# Patient Record
Sex: MALE | Race: WHITE | NOT HISPANIC OR LATINO | Employment: FULL TIME | URBAN - METROPOLITAN AREA
[De-identification: names, ages, dates, MRNs, and addresses within clinical notes are randomized per-mention and may not be internally consistent; named-entity substitution may affect disease eponyms.]

---

## 2018-01-30 ENCOUNTER — OFFICE VISIT (OUTPATIENT)
Dept: URGENT CARE | Facility: PHYSICIAN GROUP | Age: 38
End: 2018-01-30
Payer: COMMERCIAL

## 2018-01-30 VITALS
HEIGHT: 65 IN | HEART RATE: 107 BPM | OXYGEN SATURATION: 99 % | WEIGHT: 217 LBS | DIASTOLIC BLOOD PRESSURE: 86 MMHG | BODY MASS INDEX: 36.15 KG/M2 | SYSTOLIC BLOOD PRESSURE: 124 MMHG | TEMPERATURE: 97.2 F

## 2018-01-30 DIAGNOSIS — J02.0 STREP PHARYNGITIS: ICD-10-CM

## 2018-01-30 DIAGNOSIS — E66.9 OBESITY (BMI 35.0-39.9 WITHOUT COMORBIDITY): ICD-10-CM

## 2018-01-30 DIAGNOSIS — J02.9 SORE THROAT: ICD-10-CM

## 2018-01-30 LAB
INT CON NEG: NEGATIVE
INT CON POS: POSITIVE
S PYO AG THROAT QL: NEGATIVE

## 2018-01-30 PROCEDURE — 99214 OFFICE O/P EST MOD 30 MIN: CPT | Performed by: PHYSICIAN ASSISTANT

## 2018-01-30 PROCEDURE — 87880 STREP A ASSAY W/OPTIC: CPT | Performed by: PHYSICIAN ASSISTANT

## 2018-01-30 RX ORDER — AMOXICILLIN 875 MG/1
875 TABLET, COATED ORAL 2 TIMES DAILY
Qty: 20 TAB | Refills: 0 | Status: SHIPPED | OUTPATIENT
Start: 2018-01-30 | End: 2018-02-09

## 2018-01-30 ASSESSMENT — ENCOUNTER SYMPTOMS
SWOLLEN GLANDS: 1
SORE THROAT: 1
HOARSE VOICE: 1
SHORTNESS OF BREATH: 0
WHEEZING: 0
GASTROINTESTINAL NEGATIVE: 1
HEADACHES: 1
CONSTITUTIONAL NEGATIVE: 1
TROUBLE SWALLOWING: 1
CARDIOVASCULAR NEGATIVE: 1
COUGH: 0
MUSCULOSKELETAL NEGATIVE: 1

## 2018-01-30 NOTE — PROGRESS NOTES
"Subjective:      Devan Vann is a 37 y.o. male who presents with Pharyngitis (Headache, slight fever x 4 days )            Pharyngitis    This is a new problem. The current episode started in the past 7 days. The problem has been gradually worsening. There has been no fever. Associated symptoms include congestion, headaches, a hoarse voice, swollen glands and trouble swallowing. Pertinent negatives include no coughing, ear pain or shortness of breath. He has had exposure to strep. He has tried nothing for the symptoms. The treatment provided no relief.       PMH:  has no past medical history on file.  MEDS:   Current Outpatient Prescriptions:   •  amoxicillin (AMOXIL) 875 MG tablet, Take 1 Tab by mouth 2 times a day for 10 days., Disp: 20 Tab, Rfl: 0  ALLERGIES: No Known Allergies  SURGHX: History reviewed. No pertinent surgical history.  SOCHX:  reports that he has never smoked. He has never used smokeless tobacco.  FH: family history is not on file.      Review of Systems   Constitutional: Negative.    HENT: Positive for congestion, hoarse voice, sore throat and trouble swallowing. Negative for ear pain.    Respiratory: Negative for cough, shortness of breath and wheezing.    Cardiovascular: Negative.    Gastrointestinal: Negative.    Musculoskeletal: Negative.    Neurological: Positive for headaches.       Medications, Allergies, and current problem list reviewed today in Epic     Objective:     /86   Pulse (!) 107   Temp 36.2 °C (97.2 °F)   Ht 1.651 m (5' 5\")   Wt 98.4 kg (217 lb)   SpO2 99%   BMI 36.11 kg/m²      Physical Exam   Constitutional: He is oriented to person, place, and time. He appears well-developed and well-nourished. No distress.   HENT:   Head: Normocephalic and atraumatic.   Right Ear: Hearing, tympanic membrane, external ear and ear canal normal. Tympanic membrane is not erythematous.   Left Ear: Hearing, tympanic membrane, external ear and ear canal normal. Tympanic membrane is " not erythematous.   Nose: Nose normal.   Mouth/Throat: Normal dentition. No dental caries. Oropharyngeal exudate, posterior oropharyngeal edema and posterior oropharyngeal erythema present.   Eyes: Conjunctivae are normal. Right eye exhibits no discharge. Left eye exhibits no discharge. No scleral icterus.   Neck: Normal range of motion. Neck supple.   Cardiovascular: Normal rate, regular rhythm and normal heart sounds.    No murmur heard.  Pulmonary/Chest: Effort normal and breath sounds normal. No respiratory distress. He has no wheezes.   Lymphadenopathy:        Head (right side): Submandibular adenopathy present.        Head (left side): Submandibular adenopathy present.     He has cervical adenopathy.        Right cervical: No superficial cervical adenopathy present.       Left cervical: No superficial cervical adenopathy present.   Neurological: He is alert and oriented to person, place, and time.   Skin: Skin is warm, dry and intact. No rash noted. He is not diaphoretic. No cyanosis or erythema. Nails show no clubbing.   Psychiatric: He has a normal mood and affect. His behavior is normal. Judgment and thought content normal.   Nursing note and vitals reviewed.              Assessment/Plan:     1. Sore throat     2. Strep pharyngitis  amoxicillin (AMOXIL) 875 MG tablet   3. Obesity (BMI 35.0-39.9 without comorbidity)  Patient identified as having weight management issue.  Appropriate orders and counseling given.     Take full course of antibiotic  Increase fluids and rest  Advil or Tylenol for fever and pain  Warm beverages or Chloraseptic spray    Return to clinic or go to ED if symptoms worsen or persist. Indications for ED discussed at length. Patient voices understanding. Follow-up with your primary care provider in 3-5 days. Red flags discussed.    Please note that this dictation was created using voice recognition software. I have made every reasonable attempt to correct obvious errors, but I expect  that there are errors of grammar and possibly content that I did not discover before finalizing the note.

## 2018-01-30 NOTE — LETTER
January 30, 2018         Patient: Devan Vann   YOB: 1980   Date of Visit: 1/30/2018           To Whom it May Concern:    Devan Vann was seen in my clinic on 1/30/2018. He may return to work on Weds. Jan. 31st.    If you have any questions or concerns, please don't hesitate to call.        Sincerely,           Sohail James P.A.-C.  Electronically Signed

## 2018-08-02 ENCOUNTER — OFFICE VISIT (OUTPATIENT)
Dept: URGENT CARE | Facility: PHYSICIAN GROUP | Age: 38
End: 2018-08-02
Payer: COMMERCIAL

## 2018-08-02 VITALS
WEIGHT: 211 LBS | RESPIRATION RATE: 14 BRPM | DIASTOLIC BLOOD PRESSURE: 76 MMHG | SYSTOLIC BLOOD PRESSURE: 126 MMHG | OXYGEN SATURATION: 96 % | TEMPERATURE: 98.9 F | HEART RATE: 97 BPM | BODY MASS INDEX: 35.11 KG/M2

## 2018-08-02 DIAGNOSIS — L08.9 INFECTION OF SCALP: ICD-10-CM

## 2018-08-02 PROCEDURE — 99214 OFFICE O/P EST MOD 30 MIN: CPT | Performed by: FAMILY MEDICINE

## 2018-08-02 RX ORDER — SULFAMETHOXAZOLE AND TRIMETHOPRIM 800; 160 MG/1; MG/1
1 TABLET ORAL 2 TIMES DAILY
Qty: 20 TAB | Refills: 0 | Status: SHIPPED | OUTPATIENT
Start: 2018-08-02 | End: 2018-08-12

## 2018-08-05 ASSESSMENT — ENCOUNTER SYMPTOMS
FEVER: 0
HEADACHES: 0

## 2018-08-05 NOTE — PROGRESS NOTES
Subjective:   Devan Vann is a 37 y.o. male who presents for Rash (on scalp and neck. )        Rash   This is a new problem. The current episode started in the past 7 days. The problem has been gradually worsening since onset. The affected locations include the scalp. The rash is characterized by swelling, pain and draining. It is unknown if there was an exposure to a precipitant. Pertinent negatives include no congestion or fever.     Review of Systems   Constitutional: Negative for fever.   HENT: Negative for congestion.    Skin: Positive for rash.   Neurological: Negative for headaches.     No Known Allergies   Objective:   /76   Pulse 97   Temp 37.2 °C (98.9 °F)   Resp 14   Wt 95.7 kg (211 lb)   SpO2 96%   BMI 35.11 kg/m²   Physical Exam   Constitutional: He is oriented to person, place, and time. He appears well-developed and well-nourished. No distress.   HENT:   Head: Normocephalic and atraumatic.   Eyes: Pupils are equal, round, and reactive to light. Conjunctivae and EOM are normal.   Cardiovascular: Normal rate and regular rhythm.    No murmur heard.  Pulmonary/Chest: Effort normal and breath sounds normal. No respiratory distress.   Abdominal: Soft. He exhibits no distension. There is no tenderness.   Neurological: He is alert and oriented to person, place, and time. He has normal reflexes. No sensory deficit.   Skin: Skin is warm and dry.        Psychiatric: He has a normal mood and affect.         Assessment/Plan:   Assessment    1. Infection of scalp  - sulfamethoxazole-trimethoprim (BACTRIM DS) 800-160 MG tablet; Take 1 Tab by mouth 2 times a day for 10 days.  Dispense: 20 Tab; Refill: 0    Differential diagnosis, natural history, supportive care, and indications for immediate follow-up discussed.

## 2018-08-15 ENCOUNTER — TELEPHONE (OUTPATIENT)
Dept: URGENT CARE | Facility: PHYSICIAN GROUP | Age: 38
End: 2018-08-15

## 2018-08-15 NOTE — TELEPHONE ENCOUNTER
Patient states he finished the antibiotic but he isn't better.He states its about the same. He wants to know what he should do or if he should be prescribed something else. Please advise.

## 2018-08-17 ENCOUNTER — OFFICE VISIT (OUTPATIENT)
Dept: URGENT CARE | Facility: PHYSICIAN GROUP | Age: 38
End: 2018-08-17
Payer: COMMERCIAL

## 2018-08-17 VITALS
RESPIRATION RATE: 14 BRPM | HEART RATE: 87 BPM | DIASTOLIC BLOOD PRESSURE: 80 MMHG | BODY MASS INDEX: 36.15 KG/M2 | WEIGHT: 217 LBS | SYSTOLIC BLOOD PRESSURE: 122 MMHG | OXYGEN SATURATION: 97 % | HEIGHT: 65 IN | TEMPERATURE: 98.4 F

## 2018-08-17 DIAGNOSIS — L98.9 SKIN LESION OF SCALP: ICD-10-CM

## 2018-08-17 PROCEDURE — 99213 OFFICE O/P EST LOW 20 MIN: CPT | Performed by: PHYSICIAN ASSISTANT

## 2018-08-20 ASSESSMENT — ENCOUNTER SYMPTOMS
FEVER: 0
SWOLLEN GLANDS: 0
SHORTNESS OF BREATH: 0
MUSCULOSKELETAL NEGATIVE: 1
DIARRHEA: 0
VOMITING: 0
CHILLS: 0
DIZZINESS: 0
ABDOMINAL PAIN: 0
NAUSEA: 0

## 2018-08-21 NOTE — PROGRESS NOTES
"Subjective:      Devan Vann is a 37 y.o. male who presents with Hair/Scalp Problem (x 2 weeks not worse but not better. was seen 2 weeks ago in )            Hair/Scalp Problem   This is a new problem. The current episode started 1 to 4 weeks ago (2 weeks). The problem occurs constantly. The problem has been unchanged. Pertinent negatives include no abdominal pain, chest pain, chills, congestion, fever, nausea, swollen glands or vomiting. Nothing aggravates the symptoms. Treatments tried: antibiotics. The treatment provided mild relief.     Patient presents to urgent care reporting an area of swelling on his scalp that has been present for about 2 weeks. He was seen in urgent care when the problem first started and was started on a course of antibiotics. He reports when he was first seen the area was draining and somewhat painful. The draining has resolved but the lump is still present.     Review of Systems   Constitutional: Negative for chills and fever.   HENT: Negative for congestion.    Respiratory: Negative for shortness of breath.    Cardiovascular: Negative for chest pain.   Gastrointestinal: Negative for abdominal pain, diarrhea, nausea and vomiting.   Genitourinary: Negative.    Musculoskeletal: Negative.    Skin:        + scalp lesion   Neurological: Negative for dizziness.        Objective:     /80   Pulse 87   Temp 36.9 °C (98.4 °F)   Resp 14   Ht 1.651 m (5' 5\")   Wt 98.4 kg (217 lb)   SpO2 97%   BMI 36.11 kg/m²      Physical Exam   Constitutional: He is oriented to person, place, and time. He appears well-developed and well-nourished. No distress.   HENT:   Head: Normocephalic and atraumatic.       Area of rough, keratotic raised skin noted on left frontal scalp without surrounding erythema, edema, or active discharge present. No TTP.    Eyes: Pupils are equal, round, and reactive to light.   Neck: Normal range of motion.   Cardiovascular: Normal rate.    Pulmonary/Chest: Effort normal. "   Musculoskeletal: Normal range of motion.   Neurological: He is alert and oriented to person, place, and time.   Skin: Skin is warm and dry. He is not diaphoretic.   Psychiatric: He has a normal mood and affect. His behavior is normal.   Nursing note and vitals reviewed.         PMH:  has no past medical history on file.  MEDS: No current outpatient prescriptions on file.  ALLERGIES: No Known Allergies  SURGHX: History reviewed. No pertinent surgical history.  SOCHX:  reports that he has never smoked. He has never used smokeless tobacco.  FH: family history is not on file.    Assessment/Plan:     1. Skin lesion of scalp  - REFERRAL TO DERMATOLOGY    Advised area doesn't look infected at today's visit. No indication for additional course of antibiotics. Referral placed for dermatology for excision. The patient demonstrated a good understanding and agreed with the treatment plan.

## 2018-08-24 ENCOUNTER — APPOINTMENT (RX ONLY)
Dept: URBAN - METROPOLITAN AREA CLINIC 20 | Facility: CLINIC | Age: 38
Setting detail: DERMATOLOGY
End: 2018-08-24

## 2018-08-24 DIAGNOSIS — D18.0 HEMANGIOMA: ICD-10-CM

## 2018-08-24 DIAGNOSIS — L738 OTHER SPECIFIED DISEASES OF HAIR AND HAIR FOLLICLES: ICD-10-CM

## 2018-08-24 DIAGNOSIS — L73.9 FOLLICULAR DISORDER, UNSPECIFIED: ICD-10-CM

## 2018-08-24 DIAGNOSIS — D22 MELANOCYTIC NEVI: ICD-10-CM

## 2018-08-24 DIAGNOSIS — L91.8 OTHER HYPERTROPHIC DISORDERS OF THE SKIN: ICD-10-CM

## 2018-08-24 DIAGNOSIS — L663 OTHER SPECIFIED DISEASES OF HAIR AND HAIR FOLLICLES: ICD-10-CM

## 2018-08-24 PROBLEM — D18.01 HEMANGIOMA OF SKIN AND SUBCUTANEOUS TISSUE: Status: ACTIVE | Noted: 2018-08-24

## 2018-08-24 PROBLEM — D22.4 MELANOCYTIC NEVI OF SCALP AND NECK: Status: ACTIVE | Noted: 2018-08-24

## 2018-08-24 PROBLEM — L02.821 FURUNCLE OF HEAD [ANY PART, EXCEPT FACE]: Status: ACTIVE | Noted: 2018-08-24

## 2018-08-24 PROCEDURE — ? COUNSELING

## 2018-08-24 PROCEDURE — ? PRESCRIPTION

## 2018-08-24 PROCEDURE — 99202 OFFICE O/P NEW SF 15 MIN: CPT

## 2018-08-24 RX ORDER — CLINDAMYCIN PHOSPHATE 10 MG/ML
SOLUTION TOPICAL
Qty: 1 | Refills: 1 | COMMUNITY
Start: 2018-08-24

## 2018-08-24 RX ADMIN — CLINDAMYCIN PHOSPHATE: 10 SOLUTION TOPICAL at 21:01

## 2018-08-24 ASSESSMENT — LOCATION DETAILED DESCRIPTION DERM
LOCATION DETAILED: LEFT AXILLARY VAULT
LOCATION DETAILED: RIGHT CENTRAL PARIETAL SCALP
LOCATION DETAILED: RIGHT POSTERIOR AXILLA
LOCATION DETAILED: HAIR
LOCATION DETAILED: LEFT POSTERIOR AXILLA
LOCATION DETAILED: LEFT CENTRAL PARIETAL SCALP
LOCATION DETAILED: RIGHT AXILLARY VAULT

## 2018-08-24 ASSESSMENT — LOCATION SIMPLE DESCRIPTION DERM
LOCATION SIMPLE: HAIR
LOCATION SIMPLE: LEFT POSTERIOR AXILLA
LOCATION SIMPLE: SCALP
LOCATION SIMPLE: RIGHT POSTERIOR AXILLA
LOCATION SIMPLE: LEFT AXILLARY VAULT
LOCATION SIMPLE: RIGHT AXILLARY VAULT

## 2018-08-24 ASSESSMENT — LOCATION ZONE DERM
LOCATION ZONE: AXILLAE
LOCATION ZONE: SCALP

## 2018-08-24 NOTE — PROCEDURE: COUNSELING
Detail Level: Zone
Patient Specific Counseling (Will Not Stick From Patient To Patient): Pt went to Urgent Care 3.5 weeks ago and they gave him Bactrim which helped.  He went again 1 week ago and they said there wasn't any infection and sent him here.  Pt does work outside and wears a baseball hat.  He is leaving to a conference for 5d in [place] so would prefer something that will look good in his hair.  Consider clindamycin gel in the future.
Detail Level: Simple
Patient Specific Counseling (Will Not Stick From Patient To Patient): Do not bother pt.

## 2018-09-12 ENCOUNTER — APPOINTMENT (RX ONLY)
Dept: URBAN - METROPOLITAN AREA CLINIC 20 | Facility: CLINIC | Age: 38
Setting detail: DERMATOLOGY
End: 2018-09-12

## 2018-09-12 DIAGNOSIS — B07.8 OTHER VIRAL WARTS: ICD-10-CM

## 2018-09-12 DIAGNOSIS — L73.9 FOLLICULAR DISORDER, UNSPECIFIED: ICD-10-CM | Status: RESOLVED

## 2018-09-12 DIAGNOSIS — L663 OTHER SPECIFIED DISEASES OF HAIR AND HAIR FOLLICLES: ICD-10-CM | Status: RESOLVED

## 2018-09-12 DIAGNOSIS — L738 OTHER SPECIFIED DISEASES OF HAIR AND HAIR FOLLICLES: ICD-10-CM | Status: RESOLVED

## 2018-09-12 PROBLEM — L02.821 FURUNCLE OF HEAD [ANY PART, EXCEPT FACE]: Status: ACTIVE | Noted: 2018-09-12

## 2018-09-12 PROCEDURE — 99213 OFFICE O/P EST LOW 20 MIN: CPT | Mod: 25

## 2018-09-12 PROCEDURE — 17110 DESTRUCTION B9 LES UP TO 14: CPT

## 2018-09-12 PROCEDURE — ? LIQUID NITROGEN

## 2018-09-12 PROCEDURE — ? PRESCRIPTION MEDICATION MANAGEMENT

## 2018-09-12 PROCEDURE — ? COUNSELING

## 2018-09-12 ASSESSMENT — LOCATION SIMPLE DESCRIPTION DERM
LOCATION SIMPLE: SCALP
LOCATION SIMPLE: RIGHT INDEX FINGER

## 2018-09-12 ASSESSMENT — LOCATION DETAILED DESCRIPTION DERM
LOCATION DETAILED: LEFT CENTRAL PARIETAL SCALP
LOCATION DETAILED: RIGHT DISTAL PALMAR INDEX FINGER
LOCATION DETAILED: RIGHT DISTAL ULNAR PALMAR INDEX FINGER

## 2018-09-12 ASSESSMENT — LOCATION ZONE DERM
LOCATION ZONE: SCALP
LOCATION ZONE: FINGER

## 2018-09-12 NOTE — PROCEDURE: LIQUID NITROGEN
Consent: The patient's consent was obtained including but not limited to risks of crusting, scabbing, blistering, scarring, darker or lighter pigmentary change, recurrence, incomplete removal and infection.
Medical Necessity Information: It is in your best interest to select a reason for this procedure from the list below. All of these items fulfill various CMS LCD requirements except the new and changing color options.
Post-Care Instructions: I reviewed with the patient in detail post-care instructions. Patient is to avoid picking at any of the treated lesions. Pt may apply Vaseline to crusted or scabbing areas.
Include Z78.9 (Other Specified Conditions Influencing Health Status) As An Associated Diagnosis?: Yes
Add 52 Modifier (Optional): no
Detail Level: Detailed
Number Of Freeze-Thaw Cycles: 3 freeze-thaw cycles
Medical Necessity Clause: This procedure was medically necessary because the lesions that were treated were: contagious

## 2018-09-12 NOTE — PROCEDURE: COUNSELING
Detail Level: Zone
Patient Specific Counseling (Will Not Stick From Patient To Patient): Pt does work outside and wears a baseball hat.   Pt has tried Bactrim in the past for this with success.
Detail Level: Detailed

## 2018-10-16 ENCOUNTER — OFFICE VISIT (OUTPATIENT)
Dept: URGENT CARE | Facility: PHYSICIAN GROUP | Age: 38
End: 2018-10-16
Payer: COMMERCIAL

## 2018-10-16 VITALS
WEIGHT: 211 LBS | HEART RATE: 84 BPM | OXYGEN SATURATION: 99 % | TEMPERATURE: 98.7 F | HEIGHT: 65 IN | SYSTOLIC BLOOD PRESSURE: 126 MMHG | RESPIRATION RATE: 18 BRPM | BODY MASS INDEX: 35.16 KG/M2 | DIASTOLIC BLOOD PRESSURE: 72 MMHG

## 2018-10-16 DIAGNOSIS — B02.9 HERPES ZOSTER WITHOUT COMPLICATION: ICD-10-CM

## 2018-10-16 PROCEDURE — 99214 OFFICE O/P EST MOD 30 MIN: CPT | Performed by: INTERNAL MEDICINE

## 2018-10-16 NOTE — PROGRESS NOTES
"Subjective:      CC: Devan Vann is a 37 y.o. male who presents with Other (poss skin infection or cyst, comes and goes was seen 2 years ago X 4 years)      HPI:  This is a new problem. Devan Vann is a 37 y.o. male who presents today for evaluation of a skin lesion on his back. Patient states that he had a lesion in this same spot almost 2 years ago and at that time it was treated as a cellulitis. He states that this current lesion popped up about 2 weeks ago. He says that it has been slowly getting better. He didn't come in sooner because he was on Vacation in [place]. He says that the lesion does not itch but it does feel painful and sensitive. He also reports that his skin has been sensitive\" to touch under his right armpit and on the right side of his chest between his nipple and his clavicle. He has not tried anything for his symptoms. He denies fever/chills, chest pain, shortness or breath, or lightheadedness/dizziness.        Review of Systems   Constitutional: Negative for chills and fever.   HENT: Negative for congestion and sore throat.    Eyes: Negative for blurred vision and pain.   Respiratory: Negative for cough and shortness of breath.    Cardiovascular: Negative for chest pain and palpitations.   Gastrointestinal: Negative for abdominal pain, constipation, diarrhea, nausea and vomiting.   Musculoskeletal: Negative for myalgias.   Skin:        Lesion on right upper back   Neurological: Negative for dizziness and headaches.       PMH:  has no past medical history on file.  MEDS: No current outpatient prescriptions on file.  ALLERGIES: No Known Allergies  SURGHX: No past surgical history on file.  SOCHX:  reports that he has never smoked. He has never used smokeless tobacco.  FH: Family history was reviewed, no pertinent findings to report       Objective:     /72   Pulse 84   Temp 37.1 °C (98.7 °F)   Resp 18   Ht 1.651 m (5' 5\")   Wt 95.7 kg (211 lb)   SpO2 99%   BMI 35.11 kg/m²        "       Physical Exam   Constitutional: He is oriented to person, place, and time. He appears well-developed and well-nourished.   HENT:   Head: Normocephalic and atraumatic.   Right Ear: External ear normal.   Left Ear: External ear normal.   Eyes: Pupils are equal, round, and reactive to light. Conjunctivae are normal.   Neck: Normal range of motion.   Cardiovascular: Normal rate, regular rhythm and normal heart sounds.    No murmur heard.  Pulmonary/Chest: Effort normal and breath sounds normal. He has no wheezes.   Lymphadenopathy:     He has no cervical adenopathy.   Neurological: He is alert and oriented to person, place, and time.   Skin: Skin is warm and dry. Capillary refill takes less than 2 seconds.        Right upper back exhibits a lesion with a slightly erythematous base that is crusted over. There is no warmth, induration, or fluctuance to the lesion. The skin is sensitive to touch from that lesion following the T1 dermatome under the right axilla to the anterior chest wall. There are no additional lesions identified.   Psychiatric: He has a normal mood and affect. His behavior is normal. Judgment normal.          Assessment/Plan:     1. Herpes zoster without complication  Discussed with patient that since his symptoms began 2 weeks ago and he has no new lesions, there is no role for antiviral therapy at this time. Advised him to take OTC analgesics as needed for any pain. Also advised patient to establish care with a PCP, as he states he does not have one.        Differential Diagnosis, natural history, and supportive care discussed. Return to the Urgent Care if symptoms fail to resolve, or for any new or worsening symptoms. Emergency room precautions discussed. Patient family appears understanding of information.

## 2018-10-17 ASSESSMENT — ENCOUNTER SYMPTOMS
CHILLS: 0
BLURRED VISION: 0
FEVER: 0
DIZZINESS: 0
SHORTNESS OF BREATH: 0
ABDOMINAL PAIN: 0
VOMITING: 0
PALPITATIONS: 0
NAUSEA: 0
EYE PAIN: 0
COUGH: 0
SORE THROAT: 0
HEADACHES: 0
CONSTIPATION: 0
MYALGIAS: 0
DIARRHEA: 0

## 2018-11-21 ENCOUNTER — HOSPITAL ENCOUNTER (OUTPATIENT)
Facility: MEDICAL CENTER | Age: 38
End: 2018-11-21
Attending: PHYSICIAN ASSISTANT
Payer: COMMERCIAL

## 2018-11-21 ENCOUNTER — OFFICE VISIT (OUTPATIENT)
Dept: URGENT CARE | Facility: PHYSICIAN GROUP | Age: 38
End: 2018-11-21
Payer: COMMERCIAL

## 2018-11-21 VITALS
HEIGHT: 65 IN | SYSTOLIC BLOOD PRESSURE: 122 MMHG | WEIGHT: 218 LBS | BODY MASS INDEX: 36.32 KG/M2 | HEART RATE: 102 BPM | OXYGEN SATURATION: 98 % | TEMPERATURE: 98 F | DIASTOLIC BLOOD PRESSURE: 68 MMHG | RESPIRATION RATE: 16 BRPM

## 2018-11-21 DIAGNOSIS — B02.9 HERPES ZOSTER WITHOUT COMPLICATION: ICD-10-CM

## 2018-11-21 PROCEDURE — 87529 HSV DNA AMP PROBE: CPT | Mod: 91

## 2018-11-21 PROCEDURE — 99214 OFFICE O/P EST MOD 30 MIN: CPT | Performed by: PHYSICIAN ASSISTANT

## 2018-11-21 RX ORDER — VALACYCLOVIR HYDROCHLORIDE 1 G/1
1000 TABLET, FILM COATED ORAL 3 TIMES DAILY
Qty: 21 TAB | Refills: 0 | Status: SHIPPED | OUTPATIENT
Start: 2018-11-21 | End: 2018-11-28

## 2018-11-21 ASSESSMENT — ENCOUNTER SYMPTOMS
MUSCULOSKELETAL NEGATIVE: 1
CARDIOVASCULAR NEGATIVE: 1
RESPIRATORY NEGATIVE: 1
CONSTITUTIONAL NEGATIVE: 1
NEUROLOGICAL NEGATIVE: 1

## 2018-11-22 NOTE — PROGRESS NOTES
"Subjective:      Devan Vann is a 38 y.o. male who presents with Rash (Possible shingle rash on the back)        Rash     Patient presents today for a few days of suspected shingles rash on his back. Right mid back.  No other lesions but does note \"skin feels weird\" around armpit area.    Patient states that this is new episode but feels he has had this before on and off.  He admits he has been under some increased stress this week. He has not had fevers, chills or malaise.  Feels well otherwise.  No attempted interventions.     Review of Systems   Constitutional: Negative.    Respiratory: Negative.    Cardiovascular: Negative.    Musculoskeletal: Negative.    Skin: Positive for rash. Negative for itching.   Neurological: Negative.    Endo/Heme/Allergies: Negative.        PMH:  has no past medical history on file.  MEDS:   Current Outpatient Prescriptions:   •  valacyclovir (VALTREX) 1 GM Tab, Take 1 Tab by mouth 3 times a day for 7 days., Disp: 21 Tab, Rfl: 0  ALLERGIES: No Known Allergies  SURGHX: No past surgical history on file.  SOCHX:  reports that he has never smoked. He has never used smokeless tobacco.  FH: Family history was reviewed, no pertinent findings to report   Objective:     /68 (BP Location: Right arm, Patient Position: Sitting, BP Cuff Size: Adult)   Pulse (!) 102   Temp 36.7 °C (98 °F) (Temporal)   Resp 16   Ht 1.651 m (5' 5\")   Wt 98.9 kg (218 lb)   SpO2 98%   BMI 36.28 kg/m²      Physical Exam   Constitutional: He is oriented to person, place, and time. He appears well-developed and well-nourished. No distress.   HENT:   Head: Normocephalic and atraumatic.   Mouth/Throat: Oropharynx is clear and moist. No oropharyngeal exudate.   Eyes: Conjunctivae and EOM are normal.   Neck: Normal range of motion. Neck supple.   Cardiovascular: Normal rate and regular rhythm.    Pulmonary/Chest: Effort normal and breath sounds normal.   Musculoskeletal:        Back:    Lymphadenopathy:     He " has no cervical adenopathy.     He has no axillary adenopathy.   Neurological: He is alert and oriented to person, place, and time.   Skin: Skin is warm and dry. Rash noted.   Psychiatric: He has a normal mood and affect. His behavior is normal.   Vitals reviewed.             Assessment/Plan:     1. Herpes zoster without complication  valacyclovir (VALTREX) 1 GM Tab    HSV 1/2 PCR    CANCELED: HSV 1/2 PCR       -consistent with zoster as above.   -detailed discussion and education of this dx with patient.   -valtrex rx.   -declines anything for pain  -PCP follow up encouraged and recommended ot this.       Supportive care, differential diagnoses, and indications for immediate follow-up discussed with patient.   Pathogenesis of diagnosis discussed including typical length and natural progression.   Instructed to return to clinic or nearest emergency department for any change in condition, further concerns, or worsening of symptoms.  Patient states understanding of the plan of care and discharge instructions.      Asha Otto P.A.-C.

## 2018-11-24 LAB
HSV1 DNA SPEC QL NAA+PROBE: NEGATIVE
HSV2 DNA SPEC QL NAA+PROBE: POSITIVE
SPECIMEN SOURCE: ABNORMAL

## 2018-12-21 ENCOUNTER — OFFICE VISIT (OUTPATIENT)
Dept: MEDICAL GROUP | Facility: PHYSICIAN GROUP | Age: 38
End: 2018-12-21
Payer: COMMERCIAL

## 2018-12-21 VITALS
HEIGHT: 65 IN | BODY MASS INDEX: 36.65 KG/M2 | DIASTOLIC BLOOD PRESSURE: 92 MMHG | TEMPERATURE: 98.1 F | WEIGHT: 220 LBS | SYSTOLIC BLOOD PRESSURE: 146 MMHG | HEART RATE: 104 BPM | OXYGEN SATURATION: 97 %

## 2018-12-21 DIAGNOSIS — B00.9 HSV-2 INFECTION: ICD-10-CM

## 2018-12-21 DIAGNOSIS — R03.0 ELEVATED BLOOD PRESSURE READING: ICD-10-CM

## 2018-12-21 DIAGNOSIS — Z00.00 ANNUAL PHYSICAL EXAM: ICD-10-CM

## 2018-12-21 PROCEDURE — 99214 OFFICE O/P EST MOD 30 MIN: CPT | Performed by: FAMILY MEDICINE

## 2018-12-21 RX ORDER — ASPIRIN 81 MG/1
81 TABLET, CHEWABLE ORAL DAILY
COMMUNITY

## 2018-12-21 NOTE — PROGRESS NOTES
CC:  HSV-2 infection    HISTORY OF THE PRESENT ILLNESS: Patient is a 38 y.o. male. This pleasant patient is here today to establish care and discuss health problems as below.     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  He has been told multiple times that he has shingles.  He gets recurrent painful, pruritic rash which is vesicular in nature on his right upper back.  It was swabbed and he was positive for HSV 2.  In the past he states that about 10 years ago prior to this he had a severe similar infection in his genital and groin area.  Since this time this only happened on his back.  He has a lot of questions today about whether the shingles vaccine will help with this infection and how to prevent it.    Family history of cardiac disease: Dad had heart attack in his mid 50s.  Patient does take a daily baby aspirin given his family history.  He has never had any preventative lab work and does desire to do so today.    Elevated blood pressure: This is a new issue for the patient.  He typically runs in the normal range.  He has no symptoms of high blood pressure such as chest pain, headache, blurry vision.    Allergies: Patient has no known allergies.    Current Outpatient Prescriptions Ordered in [facility]   Medication Sig Dispense Refill   • aspirin (ASA) 81 MG Chew Tab chewable tablet Take 81 mg by mouth every day.       No current [facility]-ordered facility-administered medications on file.        Past Medical History:   Diagnosis Date   • Herpes        History reviewed. No pertinent surgical history.    Social History   Substance Use Topics   • Smoking status: Never Smoker   • Smokeless tobacco: Current User     Types: Chew      Comment: very rare chew once per week   • Alcohol use Yes      Comment: 6-8 drinks weekly       Social History     Social History Narrative   • No narrative on file       Family History   Problem Relation Age of Onset   • No Known Problems Mother    • Heart Disease Father        "  MI age mid-50's       ROS:     - Constitutional: Negative for fever, chills, unexpected weight change, and fatigue/generalized weakness.       - Respiratory: Negative for cough, sputum production, chest congestion, dyspnea, wheezing, and crackles.      - Cardiovascular: Negative for chest pain, palpitations, orthopnea, PND, and bilateral lower extremity edema.     - Skin: See HPI.  Negative for rash, itching, cyanotic skin color change.     - Endo/Heme/Allergies: Does not bruise/bleed easily. No polyuria or polydipsia.    Exam: Blood pressure 146/92, pulse (!) 104, temperature 36.7 °C (98.1 °F), temperature source Temporal, height 1.651 m (5' 5\"), weight 99.8 kg (220 lb), SpO2 97 %. Body mass index is 36.61 kg/m².    General: Well appearing, NAD  HEENT: Normocephalic. Conjunctiva clear, lids without ptosis, pupils equal and reactive to light accommodation, ears normal shape and contour,  oropharynx is without erythema, edema or exudates.   Neck: Supple without JVD. No thyromegaly or nodules  Pulmonary: Clear to ausculation.  Normal effort. No rales, ronchi, or wheezing.  Cardiovascular: Regular rate and rhythm without murmur, rubs or gallop.   Abdomen: Soft, nontender, nondistended. Normal bowel sounds. Liver and spleen are not palpable. No rebound or guarding  Neurologic: normal gait  Lymph: No cervical, supraclavicular  lymph nodes are palpable  Skin: Warm and dry.  Small area of erythema present on right upper back around level of T3-T4.  No remaining vesicles, drainage or open sores.  Musculoskeletal:  No extremity cyanosis, clubbing, or edema.  Psych: Normal mood and affect. Alert and oriented. Judgment and insight is normal.      Please note that this dictation was created using voice recognition software. I have made every reasonable attempt to correct obvious errors, but I expect that there are errors of grammar and possibly content that I did not discover before finalizing the " note.      Assessment/Plan  Diagnoses and all orders for this visit:    Annual physical exam  No concerns on exam or review of systems today.  Preventative labs ordered as below.  -     Lipid Profile; Future  -     COMP METABOLIC PANEL; Future  -     HEMOGLOBIN A1C; Future    Elevated blood pressure reading  This is a new problem for the patient.  I doubt hypertension at this time.  Per chart review he is always been normal and this is his first elevated blood pressure reading.  We will continue to monitor.    HSV-2 infection  Extended discussion had with patient regarding his HSV-2 infection.  If he continues to get outbreaks with significant frequency, it may be worth it to go on preventative valacyclovir.  Doubt shingles vaccine would help as this is HSV 2 and not herpes zoster.  Discussed that if he has any active sores or lesions or an outbreak, he should keep the area covered and avoid skin to skin contact with others or secondary contact with sheets or towels, etc.    Follow-up in about 1 year or sooner if needed.    Debbi Burch,   [facility]

## 2020-02-25 ENCOUNTER — OFFICE VISIT (OUTPATIENT)
Dept: MEDICAL GROUP | Facility: PHYSICIAN GROUP | Age: 40
End: 2020-02-25
Payer: COMMERCIAL

## 2020-02-25 VITALS
BODY MASS INDEX: 30.51 KG/M2 | HEART RATE: 80 BPM | DIASTOLIC BLOOD PRESSURE: 74 MMHG | WEIGHT: 206 LBS | SYSTOLIC BLOOD PRESSURE: 126 MMHG | TEMPERATURE: 97.9 F | HEIGHT: 69 IN | OXYGEN SATURATION: 97 %

## 2020-02-25 DIAGNOSIS — G56.03 BILATERAL CARPAL TUNNEL SYNDROME: ICD-10-CM

## 2020-02-25 PROCEDURE — 99214 OFFICE O/P EST MOD 30 MIN: CPT | Performed by: FAMILY MEDICINE

## 2020-02-25 SDOH — HEALTH STABILITY: MENTAL HEALTH: HOW MANY STANDARD DRINKS CONTAINING ALCOHOL DO YOU HAVE ON A TYPICAL DAY?: 1 OR 2

## 2020-02-25 SDOH — HEALTH STABILITY: MENTAL HEALTH: HOW OFTEN DO YOU HAVE A DRINK CONTAINING ALCOHOL?: 2-4 TIMES A MONTH

## 2020-02-26 NOTE — PROGRESS NOTES
CC: carpal tunnel    HISTORY OF THE PRESENT ILLNESS: Patient is a 39 y.o. male. This pleasant patient is here today to discuss the following issue.    Patient is here today with primary concern for carpal tunnel syndrome.  He actually states this is been going on for several years now.  Unfortunately in the past year it has gotten quite severe.  He works as a .  His job requires him to be using his hands quite often using tools.  His hands reportedly go numb and feel weak essentially anytime he is using tools for more than 10 to 15 minutes at a time.  He is also waking up each night with numb and tingling hands.  He would like to get carpal tunnel surgery if possible.  He has been trialing stretching and feels that has not helped.  He has not tried any night braces in a dedicated fashion.  Intermittently using ibuprofen which does not really help.    Allergies: Patient has no known allergies.    Current Outpatient Medications Ordered in Epic   Medication Sig Dispense Refill   • aspirin (ASA) 81 MG Chew Tab chewable tablet Take 81 mg by mouth every day.       No current [facility]-ordered facility-administered medications on file.        Past Medical History:   Diagnosis Date   • Herpes        History reviewed. No pertinent surgical history.    Social History     Tobacco Use   • Smoking status: Never Smoker   • Smokeless tobacco: Former User     Types: Chew   • Tobacco comment: very rare chew once per week   Substance Use Topics   • Alcohol use: Yes     Frequency: 2-4 times a month     Drinks per session: 1 or 2     Comment: 6-8 drinks weekly   • Drug use: No       Social History     Social History Narrative   • Not on file       Family History   Problem Relation Age of Onset   • No Known Problems Mother    • Heart Disease Father         MI age mid-50's       ROS:     - Constitutional: Negative for fever, chills, unexpected weight change, and fatigue/generalized weakness.     - Respiratory: Negative for cough,  "sputum production, chest congestion, dyspnea, wheezing, and crackles.      - Cardiovascular: Negative for chest pain, palpitations, orthopnea, PND, and bilateral lower extremity edema.     Exam: /74 (BP Location: Left arm, Patient Position: Sitting, BP Cuff Size: Large adult)   Pulse 80   Temp 36.6 °C (97.9 °F) (Temporal)   Ht 1.753 m (5' 9\")   Wt 93.4 kg (206 lb)   SpO2 97%  Body mass index is 30.42 kg/m².    General: Well appearing, NAD  Pulmonary: Clear to ausculation.  Normal effort. No rales, ronchi, or wheezing.  Cardiovascular: Regular rate and rhythm without murmur, rubs or gallop.   Skin: Warm and dry.  No obvious lesions.  Musculoskeletal:   Psych: Normal mood and affect. Alert and oriented. Judgment and insight is normal.    Please note that this dictation was created using voice recognition software. I have made every reasonable attempt to correct obvious errors, but I expect that there are errors of grammar and possibly content that I did not discover before finalizing the note.      Assessment/Plan  Devan was seen today for referral needed and annual exam.    Diagnoses and all orders for this visit:    Bilateral carpal tunnel syndrome  Chronic uncontrolled issue for patient, new issue to me.  I do recommend night bracing at least till he can get into see hand surgery.  Referral has been placed at this time.  -     REFERRAL TO HAND SURGERY    Health Maintenance: Patient declines preventative lab work today.    Follow-up in 1 year sooner if needed.    Debbi Burch, DO  [facility]      "

## 2020-05-28 ENCOUNTER — OFFICE VISIT (OUTPATIENT)
Dept: ADMISSIONS | Facility: MEDICAL CENTER | Age: 40
End: 2020-05-28
Attending: ORTHOPAEDIC SURGERY
Payer: COMMERCIAL

## 2020-05-28 DIAGNOSIS — Z01.812 PRE-OPERATIVE LABORATORY EXAMINATION: ICD-10-CM

## 2020-05-28 LAB — COVID ORDER STATUS COVID19: NORMAL

## 2020-05-28 PROCEDURE — C9803 HOPD COVID-19 SPEC COLLECT: HCPCS

## 2020-05-28 RX ORDER — OMEGA-3/DHA/EPA/FISH OIL 60 MG-90MG
1 CAPSULE ORAL DAILY
COMMUNITY

## 2020-05-28 RX ORDER — MULTIVIT WITH MINERALS/LUTEIN
1 TABLET ORAL DAILY
COMMUNITY

## 2020-05-30 LAB
SARS-COV-2 RNA RESP QL NAA+PROBE: NOT DETECTED
SPECIMEN SOURCE: NORMAL

## 2020-06-01 NOTE — OR NURSING
COVID-19 Pre-surgery screenin. Do you have an undiagnosed respiratory illness or symptoms such as coughing or sneezing? No (Yes/No)  a. Onset of Sx NO  b. Acute vs. chronic respiratory illness NO    2. Do you have an unexplained fever greater than 100.4 degrees Fahrenheit or 38 degrees Celsius?     NO (Yes/No)    3. Have you had direct exposure to a patient who tested positive for Covid-19?    NO (Yes/No)    4. Have you traveled outside [facility] in the last 14 days? NO    5. Have you had any lost of taste, smell, N/V or sore throat? NO    Patient has been informed of no visitor policy and asked to wear a mask upon entering the hospital   YES (Yes/No)

## 2020-06-02 ENCOUNTER — HOSPITAL ENCOUNTER (OUTPATIENT)
Facility: MEDICAL CENTER | Age: 40
End: 2020-06-02
Attending: ORTHOPAEDIC SURGERY | Admitting: ORTHOPAEDIC SURGERY
Payer: COMMERCIAL

## 2020-06-02 ENCOUNTER — ANESTHESIA EVENT (OUTPATIENT)
Dept: SURGERY | Facility: MEDICAL CENTER | Age: 40
End: 2020-06-02
Payer: COMMERCIAL

## 2020-06-02 ENCOUNTER — ANESTHESIA (OUTPATIENT)
Dept: SURGERY | Facility: MEDICAL CENTER | Age: 40
End: 2020-06-02
Payer: COMMERCIAL

## 2020-06-02 VITALS
SYSTOLIC BLOOD PRESSURE: 143 MMHG | BODY MASS INDEX: 32.53 KG/M2 | HEIGHT: 67 IN | DIASTOLIC BLOOD PRESSURE: 93 MMHG | WEIGHT: 207.23 LBS | TEMPERATURE: 97.6 F | OXYGEN SATURATION: 100 % | RESPIRATION RATE: 16 BRPM | HEART RATE: 83 BPM

## 2020-06-02 PROCEDURE — 700101 HCHG RX REV CODE 250

## 2020-06-02 PROCEDURE — 160002 HCHG RECOVERY MINUTES (STAT): Performed by: ORTHOPAEDIC SURGERY

## 2020-06-02 PROCEDURE — 160028 HCHG SURGERY MINUTES - 1ST 30 MINS LEVEL 3: Performed by: ORTHOPAEDIC SURGERY

## 2020-06-02 PROCEDURE — 160025 RECOVERY II MINUTES (STATS): Performed by: ORTHOPAEDIC SURGERY

## 2020-06-02 PROCEDURE — 501838 HCHG SUTURE GENERAL: Performed by: ORTHOPAEDIC SURGERY

## 2020-06-02 PROCEDURE — 160035 HCHG PACU - 1ST 60 MINS PHASE I: Performed by: ORTHOPAEDIC SURGERY

## 2020-06-02 PROCEDURE — 700101 HCHG RX REV CODE 250: Performed by: ORTHOPAEDIC SURGERY

## 2020-06-02 PROCEDURE — A9270 NON-COVERED ITEM OR SERVICE: HCPCS | Performed by: ORTHOPAEDIC SURGERY

## 2020-06-02 PROCEDURE — 160009 HCHG ANES TIME/MIN: Performed by: ORTHOPAEDIC SURGERY

## 2020-06-02 PROCEDURE — 160046 HCHG PACU - 1ST 60 MINS PHASE II: Performed by: ORTHOPAEDIC SURGERY

## 2020-06-02 PROCEDURE — 700111 HCHG RX REV CODE 636 W/ 250 OVERRIDE (IP): Performed by: ANESTHESIOLOGY

## 2020-06-02 PROCEDURE — 700102 HCHG RX REV CODE 250 W/ 637 OVERRIDE(OP): Performed by: ORTHOPAEDIC SURGERY

## 2020-06-02 PROCEDURE — 700105 HCHG RX REV CODE 258: Performed by: ORTHOPAEDIC SURGERY

## 2020-06-02 PROCEDURE — 160048 HCHG OR STATISTICAL LEVEL 1-5: Performed by: ORTHOPAEDIC SURGERY

## 2020-06-02 RX ORDER — BUPIVACAINE HYDROCHLORIDE 5 MG/ML
INJECTION, SOLUTION EPIDURAL; INTRACAUDAL
Status: DISCONTINUED
Start: 2020-06-02 | End: 2020-06-02 | Stop reason: HOSPADM

## 2020-06-02 RX ORDER — HYDROMORPHONE HYDROCHLORIDE 1 MG/ML
0.2 INJECTION, SOLUTION INTRAMUSCULAR; INTRAVENOUS; SUBCUTANEOUS
Status: DISCONTINUED | OUTPATIENT
Start: 2020-06-02 | End: 2020-06-02 | Stop reason: HOSPADM

## 2020-06-02 RX ORDER — LABETALOL HYDROCHLORIDE 5 MG/ML
5 INJECTION, SOLUTION INTRAVENOUS
Status: DISCONTINUED | OUTPATIENT
Start: 2020-06-02 | End: 2020-06-02 | Stop reason: HOSPADM

## 2020-06-02 RX ORDER — OXYCODONE HYDROCHLORIDE AND ACETAMINOPHEN 5; 325 MG/1; MG/1
2 TABLET ORAL
Status: DISCONTINUED | OUTPATIENT
Start: 2020-06-02 | End: 2020-06-02 | Stop reason: HOSPADM

## 2020-06-02 RX ORDER — METOPROLOL TARTRATE 1 MG/ML
1 INJECTION, SOLUTION INTRAVENOUS
Status: DISCONTINUED | OUTPATIENT
Start: 2020-06-02 | End: 2020-06-02 | Stop reason: HOSPADM

## 2020-06-02 RX ORDER — CEFAZOLIN SODIUM 1 G/3ML
INJECTION, POWDER, FOR SOLUTION INTRAMUSCULAR; INTRAVENOUS PRN
Status: DISCONTINUED | OUTPATIENT
Start: 2020-06-02 | End: 2020-06-02 | Stop reason: SURG

## 2020-06-02 RX ORDER — OXYCODONE HYDROCHLORIDE AND ACETAMINOPHEN 5; 325 MG/1; MG/1
1 TABLET ORAL
Status: DISCONTINUED | OUTPATIENT
Start: 2020-06-02 | End: 2020-06-02 | Stop reason: HOSPADM

## 2020-06-02 RX ORDER — SODIUM CHLORIDE, SODIUM LACTATE, POTASSIUM CHLORIDE, CALCIUM CHLORIDE 600; 310; 30; 20 MG/100ML; MG/100ML; MG/100ML; MG/100ML
INJECTION, SOLUTION INTRAVENOUS CONTINUOUS
Status: DISCONTINUED | OUTPATIENT
Start: 2020-06-02 | End: 2020-06-02 | Stop reason: HOSPADM

## 2020-06-02 RX ORDER — LIDOCAINE HYDROCHLORIDE AND EPINEPHRINE 10; 10 MG/ML; UG/ML
INJECTION, SOLUTION INFILTRATION; PERINEURAL
Status: DISCONTINUED | OUTPATIENT
Start: 2020-06-02 | End: 2020-06-02 | Stop reason: HOSPADM

## 2020-06-02 RX ORDER — HYDRALAZINE HYDROCHLORIDE 20 MG/ML
5 INJECTION INTRAMUSCULAR; INTRAVENOUS
Status: DISCONTINUED | OUTPATIENT
Start: 2020-06-02 | End: 2020-06-02 | Stop reason: HOSPADM

## 2020-06-02 RX ORDER — LIDOCAINE HYDROCHLORIDE 10 MG/ML
INJECTION, SOLUTION EPIDURAL; INFILTRATION; INTRACAUDAL; PERINEURAL
Status: DISCONTINUED
Start: 2020-06-02 | End: 2020-06-02 | Stop reason: HOSPADM

## 2020-06-02 RX ORDER — DIPHENHYDRAMINE HYDROCHLORIDE 50 MG/ML
12.5 INJECTION INTRAMUSCULAR; INTRAVENOUS
Status: DISCONTINUED | OUTPATIENT
Start: 2020-06-02 | End: 2020-06-02 | Stop reason: HOSPADM

## 2020-06-02 RX ORDER — BUPIVACAINE HYDROCHLORIDE 5 MG/ML
INJECTION, SOLUTION PERINEURAL
Status: DISCONTINUED | OUTPATIENT
Start: 2020-06-02 | End: 2020-06-02 | Stop reason: HOSPADM

## 2020-06-02 RX ORDER — HALOPERIDOL 5 MG/ML
1 INJECTION INTRAMUSCULAR
Status: DISCONTINUED | OUTPATIENT
Start: 2020-06-02 | End: 2020-06-02 | Stop reason: HOSPADM

## 2020-06-02 RX ORDER — HYDROMORPHONE HYDROCHLORIDE 1 MG/ML
0.1 INJECTION, SOLUTION INTRAMUSCULAR; INTRAVENOUS; SUBCUTANEOUS
Status: DISCONTINUED | OUTPATIENT
Start: 2020-06-02 | End: 2020-06-02 | Stop reason: HOSPADM

## 2020-06-02 RX ORDER — HYDROMORPHONE HYDROCHLORIDE 1 MG/ML
0.4 INJECTION, SOLUTION INTRAMUSCULAR; INTRAVENOUS; SUBCUTANEOUS
Status: DISCONTINUED | OUTPATIENT
Start: 2020-06-02 | End: 2020-06-02 | Stop reason: HOSPADM

## 2020-06-02 RX ADMIN — FENTANYL CITRATE 100 MCG: 50 INJECTION INTRAMUSCULAR; INTRAVENOUS at 12:05

## 2020-06-02 RX ADMIN — SODIUM CHLORIDE, POTASSIUM CHLORIDE, SODIUM LACTATE AND CALCIUM CHLORIDE: 600; 310; 30; 20 INJECTION, SOLUTION INTRAVENOUS at 10:59

## 2020-06-02 RX ADMIN — PROPOFOL 200 MG: 10 INJECTION, EMULSION INTRAVENOUS at 12:05

## 2020-06-02 RX ADMIN — CEFAZOLIN 2 G: 330 INJECTION, POWDER, FOR SOLUTION INTRAMUSCULAR; INTRAVENOUS at 12:05

## 2020-06-02 RX ADMIN — POVIDONE-IODINE 15 ML: 10 SOLUTION TOPICAL at 10:59

## 2020-06-02 ASSESSMENT — PAIN SCALES - GENERAL: PAIN_LEVEL: 2

## 2020-06-02 NOTE — DISCHARGE INSTRUCTIONS
ACTIVITY: Rest and take it easy for the first 24 hours.  A responsible adult is recommended to remain with you during that time.  It is normal to feel sleepy.  We encourage you to not do anything that requires balance, judgment or coordination.    MILD FLU-LIKE SYMPTOMS ARE NORMAL. YOU MAY EXPERIENCE GENERALIZED MUSCLE ACHES, THROAT IRRITATION, HEADACHE AND/OR SOME NAUSEA.    FOR 24 HOURS DO NOT:  Drive, operate machinery or run household appliances.  Drink beer or alcoholic beverages.   Make important decisions or sign legal documents.    SPECIAL INSTRUCTIONS: See MD instructions    DIET: To avoid nausea, slowly advance diet as tolerated, avoiding spicy or greasy foods for the first day.  Add more substantial food to your diet according to your physician's instructions.  Babies can be fed formula or breast milk as soon as they are hungry.  INCREASE FLUIDS AND FIBER TO AVOID CONSTIPATION.    FOLLOW-UP APPOINTMENT:  A follow-up appointment should be arranged with your doctor in 10-14 days; call to schedule.    You should CALL YOUR PHYSICIAN if you develop:  Fever greater than 101 degrees F.  Pain not relieved by medication, or persistent nausea or vomiting.  Excessive bleeding (blood soaking through dressing) or unexpected drainage from the wound.  Extreme redness or swelling around the incision site, drainage of pus or foul smelling drainage.  Inability to urinate or empty your bladder within 8 hours.  Problems with breathing or chest pain.    You should call 911 if you develop problems with breathing or chest pain.  If you are unable to contact your doctor or surgical center, you should go to the nearest emergency room or urgent care center.      If any questions arise, call your doctor.  If your doctor is not available, please feel free to call the Surgical Center at [phone].  The Center is open Monday through Friday from 7AM to 7PM.  You can also call the [employer] HOTLINE open 24 hours/day, 7 days/week and  speak to a nurse at [phone], or toll free at [phone].    A registered nurse may call you a few days after your surgery to see how you are doing after your procedure.    MEDICATIONS: Resume taking daily medication.  Take prescribed pain medication with food.  If no medication is prescribed, you may take non-aspirin pain medication if needed.  PAIN MEDICATION CAN BE VERY CONSTIPATING.  Take a stool softener or laxative such as senokot, pericolace, or milk of magnesia if needed.    Prescription given for oxycodone.  Last pain medication given at none.    If your physician has prescribed pain medication that includes Acetaminophen (Tylenol), do not take additional Acetaminophen (Tylenol) while taking the prescribed medication.    Depression / Suicide Risk    As you are discharged from this [facility] facility, it is important to learn how to keep safe from harming yourself.    Recognize the warning signs:  · Abrupt changes in personality, positive or negative- including increase in energy   · Giving away possessions  · Change in eating patterns- significant weight changes-  positive or negative  · Change in sleeping patterns- unable to sleep or sleeping all the time   · Unwillingness or inability to communicate  · Depression  · Unusual sadness, discouragement and loneliness  · Talk of wanting to die  · Neglect of personal appearance   · Rebelliousness- reckless behavior  · Withdrawal from people/activities they love  · Confusion- inability to concentrate     If you or a loved one observes any of these behaviors or has concerns about self-harm, here's what you can do:  · Talk about it- your feelings and reasons for harming yourself  · Remove any means that you might use to hurt yourself (examples: pills, rope, extension cords, firearm)  · Get professional help from the community (Mental Health, Substance Abuse, psychological counseling)  · Do not be alone:Call your Safe Contact- someone whom you trust  who will be there for you.  · Call your local CRISIS HOTLINE 662-0737 or 995-841-0266  · Call your local Children's Mobile Crisis Response Team [place] (378) 184-2227 or www.Teamly  · Call the toll free National Suicide Prevention Hotlines   · National Suicide Prevention Lifeline 671-245-VLLL (4060)  · National [employer] Network 800-SUICIDE (762-9009)

## 2020-06-02 NOTE — ANESTHESIA POSTPROCEDURE EVALUATION
Patient: Devan Vann    Procedure Summary     Date:  06/02/20 Room / Location:  [facility] ROOM 28 / SURGERY SAME DAY [facility]    Anesthesia Start:  1205 Anesthesia Stop:  1230    Procedure:  RELEASE, CARPAL TUNNEL (Left Hand) Diagnosis:  (CARPAL TUNNEL SYNDROME BILATERAL UPPER LIMBS)    Surgeon:  Tong Engle M.D. Responsible Provider:  Ezequiel Lehman D.O.    Anesthesia Type:  general ASA Status:  2          Final Anesthesia Type: general  Last vitals  BP   Blood Pressure: 107/68    Temp   36.6 °C (97.8 °F)    Pulse   Pulse: 86   Resp   18    SpO2   98 %      Anesthesia Post Evaluation    Patient location during evaluation: PACU  Patient participation: complete - patient participated  Level of consciousness: awake and alert  Pain score: 2    Airway patency: patent  Anesthetic complications: no  Cardiovascular status: hemodynamically stable  Respiratory status: acceptable  Hydration status: euvolemic    PONV: none           Nurse Pain Score: 0 (NPRS)

## 2020-06-02 NOTE — ANESTHESIA PROCEDURE NOTES
Airway    Date/Time: 6/2/2020 12:06 PM  Performed by: Ezequiel Lehman D.O.  Authorized by: Ezequiel Lehman D.O.     Location:  OR  Urgency:  Elective  Indications for Airway Management:  Anesthesia      Spontaneous Ventilation: absent    Sedation Level:  Deep  Preoxygenated: Yes    Final Airway Type:  Supraglottic airway  Final Supraglottic Airway:  Standard LMA    SGA Size:  5  Number of Attempts at Approach:  1

## 2020-06-02 NOTE — OP REPORT
OPERATIVE NOTE     DATE OF PROCEDURE: 6/2/2020            PRE-OP DIAGNOSIS: Left carpal tunnel syndrome            POST-OP DIAGNOSIS: same            PROCEDURE: Left carpal tunnel release            SURGEON: Tong Engle M.D. - Primary            ANESTHESIA: MAC            ESTIMATED BLOOD LOSS: Minimal                   SPECIMENS: None            COMPLICATIONS: None            CONDITION: Stable to PACU            OPERATIVE INDICATIONS AND DESCRIPTION OF PROCEDURE: This is a pleasant 39-year-old gentleman who presented to my office with left carpal tunnel syndrome.  Diagnosis was confirmed with an EMG.  They failed conservative treatments.  We discussed carpal tunnel release.  Risks including, but not limited to, bleeding, infection, stiffness, recurrence of symptoms, incomplete resolution of symptoms, expected recovery depending on preoperative EMG findings, risks of anesthesia, were discussed.  They elected to proceed.  The patient was seen in the preoperative holding area, identified, and the wrist was marked.  They were taken back to the operating room.  Light sedation was induced by the anesthesia provider. A time out was performed. A median nerve block was performed using a 10cc (50/50%) mixture of 1% lidocaine and 0.5% bupivacaine.  A tourniquet was placed on the forearm and the upper extremity was prepped and draped in usual orthopedic fashion.  Another timeout was performed.  The tourniquet was inflated to 250 mmHg.  A 1 cm longitudinal incision was made in the palm.  I dissected through the subcutaneous tissues and down to the palmar fascia.  The palmar fascia was longitudinally split.  Deep retractors were placed.  The transverse carpal ligament was identified.  I incised the distal part of the transverse carpal ligament with a 15 blade.  I placed the Integra safeguard sled underneath the transverse carpal ligament, and above the median nerve, making sure to protect the nerve at all times.  Once I was  happy with the positioning of the sled and that the nerve was protected, I slid the Integra blade proximally along the groove of the sled, transecting the remainder of the transverse carpal ligament.  I visually confirmed complete transection of the entire ligament as well as maintained protection of the median nerve. I inspected the distal part of the transverse carpal ligament to make sure it was completely transected.  The wound was irrigated with normal saline.  The skin was closed with 4-0 nylon suture.  A sterile dressing was placed. The tourniquet was deflated.  The patient awoke from anesthesia and was transferred to the PACU in stable condition.

## 2020-06-02 NOTE — ANESTHESIA TIME REPORT
Anesthesia Start and Stop Event Times     Date Time Event    6/2/2020 1105 Ready for Procedure     1205 Anesthesia Start     1230 Anesthesia Stop        Responsible Staff  06/02/20    Name Role Begin End    Ezequiel Lehman D.O. Anesth 1205 1230        Preop Diagnosis (Free Text):  Pre-op Diagnosis     CARPAL TUNNEL SYNDROME BILATERAL UPPER LIMBS        Preop Diagnosis (Codes):    Post op Diagnosis  Carpal tunnel syndrome      Premium Reason  Non-Premium    Comments:

## 2020-06-02 NOTE — DISCHARGE INSTR - OTHER INFO
DR. ENGLE'S POST-OPERATIVE INSTRUCTIONS    You have just undergone a carpal tunnel release by Dr. Engle in the operating room.  It is our wish that your postoperative recovery be as quick and comfortable as possible.  Please carefully review the following items that are important in your recovery.    After any operation, a certain degree of pain is to be expected. Take Advil (ibuprofen) and Extra Strength Tylenol as first line medications for mild to moderate pain. Taking each one every 6 hours, and staggering them so that you are taking one medicine every 3 hours, is the most effective. Refer to dosing instructions on the bottle, but in general ibuprofen dose is 600-800mg and Tylenol dose is 500mg. For most small procedures, this should be enough to keep you comfortable.  You may have been given a small prescription for stronger pain medicine which will help relieve more severe pain.  Pain medicine may make you drowsy so please curtail your activities appropriately.  Do not drive while taking pain medicine.      When you go home, please keep your operated arm elevated at all times (above the level of your heart).  If you do this, your swelling will be diminished and your pain will be diminished as well. You may also place an ice pack over your dressing or splint to help with swelling and pain.    For small hand procedures such as carpal tunnel release, trigger finger release, and cyst excision, the dressing that you have on your extremity should remain on for 3-4 days. It may then be removed, you can wash the incision gently with soap and water, and keep the incision covered with a band-aid or similar clean dressing. For larger procedures or if you have a splint on, these should remain on until follow up or as specifically instructed. If you feel that your dressing is too tight during the first 3 days after  surgery, you may loosen it. It is normal to see minor staining on the hand surgery dressing after surgery. If there is significant bleeding, you are advised to call the office during regular office hours to have this checked.  Make sure that your dressing is kept dry at all times.  You can take a shower if you cover your arm with a plastic bag. If your dressing gets wet, take it off and place band-aids on the wound and wrap it with sterile dressing that you can obtain from your local drug store.    Please call our office for a follow-up appointment, [phone]. Follow up after surgery is typically 10-14 days, unless you were specifically instructed otherwise. The sutures will be removed and you may be asked to see a hand therapist to optimize your functional result. Each of the hand therapists that you will be referred to have received special training in the care of the hand and upper extremity.    If you have questions regarding your surgery postop that you feel requires attention, please call the office at [phone] during business hours, or [phone] after hours for the answering service. If you feel that you have a surgical emergency postoperatively that requires immediate attention, please call the above numbers or go to the Emergency Department and ask for the Orthopedic Surgeon on call.

## 2020-06-02 NOTE — OR NURSING
1316 Pt to PACU II from PACU I. Report from anesthesia and OR RN. L hand ACE CDI, CMS +. Respirations even and unlabored. VSS. Tolerating gingerale.     1345 Discharge orders received. Meets discharge criteria at this time. No pain. No nausea. Tolerating PO. On RA. All lines and monitors discontinued. Reviewed discharge paperwork with pt and mother (over phone). Discussed diet, activity, medications, follow up care and worsening symptoms. No questions at this time. Pt to be discharged to home via private vehicle. Offered hospital escort and wc, pt declined, ambulated out of department with CNA, and all belongings. Paperwork signed by mother at car.

## 2020-06-02 NOTE — OR NURSING
1223 Received pt from PACU, received report from OR RN and anesthesiologist. Pt sleeping, soft wrap dressing to left hand, CDI.     1237 Pt.'s oral airway dc'd without difficulty.     1245 Pt tolerating fluids, denies pain/nausea.     1310 Report to Tesha BEY.

## 2020-06-02 NOTE — ANESTHESIA PREPROCEDURE EVALUATION
Relevant Problems   No relevant active problems       Physical Exam    Airway   Mallampati: II  TM distance: >3 FB  Neck ROM: full       Cardiovascular - normal exam  Rhythm: regular  Rate: normal  (-) murmur     Dental - normal exam           Pulmonary - normal exam  Breath sounds clear to auscultation     Abdominal    Neurological - normal exam                 Anesthesia Plan    ASA 2       Plan - general       Airway plan will be LMA        Induction: intravenous    Postoperative Plan: Postoperative administration of opioids is intended.    Pertinent diagnostic labs and testing reviewed    Informed Consent:    Anesthetic plan and risks discussed with patient.    Use of blood products discussed with: patient whom consented to blood products.

## 2020-08-04 ENCOUNTER — OFFICE VISIT (OUTPATIENT)
Dept: MEDICAL GROUP | Facility: PHYSICIAN GROUP | Age: 40
End: 2020-08-04
Payer: COMMERCIAL

## 2020-08-04 VITALS
DIASTOLIC BLOOD PRESSURE: 98 MMHG | HEART RATE: 101 BPM | WEIGHT: 205 LBS | OXYGEN SATURATION: 97 % | BODY MASS INDEX: 30.36 KG/M2 | SYSTOLIC BLOOD PRESSURE: 132 MMHG | HEIGHT: 69 IN | TEMPERATURE: 97.2 F

## 2020-08-04 DIAGNOSIS — G47.00 INSOMNIA, UNSPECIFIED TYPE: ICD-10-CM

## 2020-08-04 DIAGNOSIS — Z00.00 PREVENTATIVE HEALTH CARE: ICD-10-CM

## 2020-08-04 DIAGNOSIS — B00.9 HSV-2 INFECTION: ICD-10-CM

## 2020-08-04 PROCEDURE — 99214 OFFICE O/P EST MOD 30 MIN: CPT | Performed by: FAMILY MEDICINE

## 2020-08-04 RX ORDER — TRAZODONE HYDROCHLORIDE 100 MG/1
50-100 TABLET ORAL NIGHTLY PRN
Qty: 30 TAB | Refills: 11 | Status: SHIPPED | OUTPATIENT
Start: 2020-08-04 | End: 2020-11-16

## 2020-08-04 RX ORDER — VALACYCLOVIR HYDROCHLORIDE 500 MG/1
TABLET, FILM COATED ORAL
Qty: 30 TAB | Refills: 1 | Status: SHIPPED | OUTPATIENT
Start: 2020-08-04 | End: 2020-10-22

## 2020-08-04 ASSESSMENT — PATIENT HEALTH QUESTIONNAIRE - PHQ9: CLINICAL INTERPRETATION OF PHQ2 SCORE: 0

## 2020-08-05 NOTE — PROGRESS NOTES
CC: Herpes infection    HISTORY OF THE PRESENT ILLNESS: Patient is a 39 y.o. male. This pleasant patient is here today to discuss the following issue.    Patient is here primarily for known HSV outbreak.  He intermittently gets them, but this 1 is somewhat severe.  Located on his right upper back in its usual spot, in addition to his scalp patches.  Historically he has responded well to valacyclovir.  Hoping to get this medication today in addition to some refills in the event that he has further outbreaks.  This is not a shingles outbreak, but it has been swabbed before and is HSV-2.    Patient also wondering what he can do for insomnia.  Notes that in the past he took Ambien but is open to other options.  He is taking melatonin and Benadryl if he wakes up in the middle the night.  Feels that he is groggy from doing so.  Wondering if there is anything else he can try.  He falls asleep okay, but staying asleep is his biggest challenge.    Allergies: Patient has no known allergies.    Current Outpatient Medications Ordered in Epic   Medication Sig Dispense Refill   • traZODone (DESYREL) 100 MG Tab Take 0.5-1 Tabs by mouth at bedtime as needed for Sleep. 30 Tab 11   • valACYclovir (VALTREX) 500 MG Tab Take 1 tab twice daily for 3 days as needed for outbreak. 30 Tab 1   • Ascorbic Acid (VITAMIN C) 1000 MG Tab Take 1 Tab by mouth every day.     • Omega-3 Fatty Acids (FISH OIL) 500 MG Cap Take 1 Cap by mouth every day.     • aspirin (ASA) 81 MG Chew Tab chewable tablet Take 81 mg by mouth every day.       No current [facility]-ordered facility-administered medications on file.        Past Medical History:   Diagnosis Date   • Herpes        Past Surgical History:   Procedure Laterality Date   • PB REVISE MEDIAN N/CARPAL TUNNEL SURG Left 6/2/2020    Procedure: RELEASE, CARPAL TUNNEL;  Surgeon: Tong Engle M.D.;  Location: SURGERY SAME DAY [facility];  Service: Orthopedics   • OTHER ORTHOPEDIC SURGERY      carpel tunnel  "right hand       Social History     Tobacco Use   • Smoking status: Never Smoker   • Smokeless tobacco: Former User     Types: Chew   • Tobacco comment: very rare chew once per week   Substance Use Topics   • Alcohol use: Yes     Frequency: 2-4 times a month     Drinks per session: 1 or 2     Comment: 6-8 drinks weekly   • Drug use: No       Social History     Social History Narrative   • Not on file       Family History   Problem Relation Age of Onset   • No Known Problems Mother    • Heart Disease Father         MI age mid-50's       ROS:     - Constitutional: Negative for fever, chills, unexpected weight change, and fatigue/generalized weakness.     - Respiratory: Negative for cough, sputum production, chest congestion, dyspnea, wheezing, and crackles.      - Cardiovascular: Negative for chest pain, palpitations, orthopnea, PND, and bilateral lower extremity edema.     Exam: /98 (BP Location: Left arm, Patient Position: Sitting, BP Cuff Size: Large adult)   Pulse (!) 101   Temp 36.2 °C (97.2 °F) (Temporal)   Ht 1.753 m (5' 9\")   Wt 93 kg (205 lb)   SpO2 97%  Body mass index is 30.27 kg/m².    General: Well appearing, NAD  Pulmonary: Clear to ausculation.  Normal effort. No rales, ronchi, or wheezing.  Cardiovascular: Regular rate and rhythm without murmur, rubs or gallop.   Skin: Warm and dry.  Blistering herpetic appearing rash noted on upper back with small patches noted on right medial arm and left scalp.  Musculoskeletal:  No extremity cyanosis, clubbing, or edema.  Psych: Normal mood and affect. Alert and oriented. Judgment and insight is normal.    Please note that this dictation was created using voice recognition software. I have made every reasonable attempt to correct obvious errors, but I expect that there are errors of grammar and possibly content that I did not discover before finalizing the note.      Assessment/Plan  Dvean was seen today for herpes zoster and insomnia.    Diagnoses and " all orders for this visit:    Insomnia, unspecified type  Chronic issue, we will go ahead and trial trazodone at this time.  Side effects discussed.  Patient agrees to let me know if trazodone is not helping.  -     traZODone (DESYREL) 100 MG Tab; Take 0.5-1 Tabs by mouth at bedtime as needed for Sleep.    HSV-2 infection  Chronic issue, with intermittent outbreaks.  We will go ahead and refill valacyclovir.  Patient does not feel he gets outbreaks frequently enough to take suppressive therapy at this time.  Have given him a few refills in the event of further outbreaks.  -     valACYclovir (VALTREX) 500 MG Tab; Take 1 tab twice daily for 3 days as needed for outbreak.    Preventative health care  -     Comp Metabolic Panel; Future  -     Lipid Profile; Future      Follow-up in 1 year or sooner if needed.    Debbi Burch DO  [facility]

## 2020-10-20 DIAGNOSIS — B00.9 HSV-2 INFECTION: ICD-10-CM

## 2020-10-22 RX ORDER — VALACYCLOVIR HYDROCHLORIDE 500 MG/1
TABLET, FILM COATED ORAL
Qty: 30 TAB | Refills: 1 | Status: SHIPPED | OUTPATIENT
Start: 2020-10-22 | End: 2022-05-27 | Stop reason: SDUPTHER

## 2020-11-16 ENCOUNTER — TELEMEDICINE (OUTPATIENT)
Dept: MEDICAL GROUP | Facility: PHYSICIAN GROUP | Age: 40
End: 2020-11-16
Payer: COMMERCIAL

## 2020-11-16 VITALS — TEMPERATURE: 98.4 F | WEIGHT: 198 LBS | HEIGHT: 69 IN | BODY MASS INDEX: 29.33 KG/M2

## 2020-11-16 DIAGNOSIS — Z20.2 EXPOSURE TO SEXUALLY TRANSMITTED DISEASE (STD): ICD-10-CM

## 2020-11-16 PROCEDURE — 99214 OFFICE O/P EST MOD 30 MIN: CPT | Mod: 95,CR | Performed by: FAMILY MEDICINE

## 2020-11-16 RX ORDER — AZITHROMYCIN 500 MG/1
2000 TABLET, FILM COATED ORAL ONCE
Qty: 4 TAB | Refills: 0 | Status: SHIPPED | OUTPATIENT
Start: 2020-11-16 | End: 2020-11-16

## 2020-11-17 NOTE — PROGRESS NOTES
Virtual Visit: Established Patient   This visit was conducted via Zoom using secure and encrypted videoconferencing technology. The patient was in a private location in the [facility].    The patient's identity was confirmed and verbal consent was obtained for this virtual visit.    Subjective:   CC:   Chief Complaint   Patient presents with   • Exposure to STD       Devan Vann is a 40 y.o. male presenting for evaluation and management of:    Patient is here today with concern for STD exposure.  He states that his current sexual partner was recently diagnosed with gonorrhea and chlamydia.  As she is a nurse, she told him he should get treated as well.  Patient denies any symptoms at this time including dysuria or penile discharge.  Patient is currently residing in [place] due to his job, but he will be coming back next week for the whole week of Thanksgiving.    ROS   Denies any recent fevers or chills. No nausea or vomiting. No chest pains or shortness of breath.     No Known Allergies    Current medicines (including changes today)  Current Outpatient Medications   Medication Sig Dispense Refill   • azithromycin (ZITHROMAX) 500 MG tablet Take 4 Tabs by mouth Once for 1 dose. 4 Tab 0   • valACYclovir (VALTREX) 500 MG Tab TAKE 1 TAB TWICE DAILY FOR 3 DAYS AS NEEDED FOR OUTBREAK. 30 Tab 1   • Ascorbic Acid (VITAMIN C) 1000 MG Tab Take 1 Tab by mouth every day.     • Omega-3 Fatty Acids (FISH OIL) 500 MG Cap Take 1 Cap by mouth every day.     • aspirin (ASA) 81 MG Chew Tab chewable tablet Take 81 mg by mouth every day.       No current facility-administered medications for this visit.        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  He  has a past surgical history that includes other orthopedic surgery and pr revise median n/carpal tunnel surg (Left, 6/2/2020).       Objective:   Temp 36.9 °C (98.4 °F) (Temporal)   Ht 1.753 m (5' 9\")   Wt 89.8 kg (198 lb)   BMI 29.24 kg/m²     Physical Exam:  Constitutional: Alert, no distress, well-groomed.  Skin: No rashes in visible areas.  Eye: Round. Conjunctiva clear, lids normal. No icterus.   ENMT: Lips pink without lesions, good dentition, moist mucous membranes. Phonation normal.  Neck: No masses, no thyromegaly. Moves freely without pain.  Respiratory: Unlabored respiratory effort, no cough or audible wheeze  Psych: Alert and oriented x3, normal affect and mood.       Assessment and Plan:   The following treatment plan was discussed:     1. Exposure to sexually transmitted disease (STD)  Patient with recent exposure to gonorrhea and chlamydia.  Unfortunately given that he is out of town, I cannot do a Rocephin injection at this time.  Instead I will go ahead and do high-dose azithromycin, 2 g x 1.  He will then need a test of cure though 7 days after this treatment.  Test of cure ordered, and patient will be in town at that time.  - CHLAMYDIA/GC PCR URINE OR SWAB; Future  - azithromycin (ZITHROMAX) 500 MG tablet; Take 4 Tabs by mouth Once for 1 dose.  Dispense: 4 Tab; Refill: 0        Follow-up: Return if symptoms worsen or fail to improve.           "

## 2020-12-28 ENCOUNTER — HOSPITAL ENCOUNTER (OUTPATIENT)
Facility: MEDICAL CENTER | Age: 40
End: 2020-12-28
Attending: NURSE PRACTITIONER
Payer: COMMERCIAL

## 2020-12-28 ENCOUNTER — OFFICE VISIT (OUTPATIENT)
Dept: URGENT CARE | Facility: PHYSICIAN GROUP | Age: 40
End: 2020-12-28
Payer: COMMERCIAL

## 2020-12-28 VITALS
SYSTOLIC BLOOD PRESSURE: 134 MMHG | DIASTOLIC BLOOD PRESSURE: 98 MMHG | RESPIRATION RATE: 16 BRPM | HEIGHT: 69 IN | WEIGHT: 196.8 LBS | HEART RATE: 122 BPM | OXYGEN SATURATION: 100 % | BODY MASS INDEX: 29.15 KG/M2 | TEMPERATURE: 98.6 F

## 2020-12-28 DIAGNOSIS — R05.9 COUGH: ICD-10-CM

## 2020-12-28 DIAGNOSIS — Z20.822 SUSPECTED COVID-19 VIRUS INFECTION: ICD-10-CM

## 2020-12-28 PROCEDURE — U0003 INFECTIOUS AGENT DETECTION BY NUCLEIC ACID (DNA OR RNA); SEVERE ACUTE RESPIRATORY SYNDROME CORONAVIRUS 2 (SARS-COV-2) (CORONAVIRUS DISEASE [COVID-19]), AMPLIFIED PROBE TECHNIQUE, MAKING USE OF HIGH THROUGHPUT TECHNOLOGIES AS DESCRIBED BY CMS-2020-01-R: HCPCS

## 2020-12-28 PROCEDURE — 99214 OFFICE O/P EST MOD 30 MIN: CPT | Mod: CS | Performed by: NURSE PRACTITIONER

## 2020-12-28 RX ORDER — AZITHROMYCIN 500 MG/1
TABLET, FILM COATED ORAL
COMMUNITY
Start: 2020-11-16 | End: 2020-12-28

## 2020-12-28 ASSESSMENT — ENCOUNTER SYMPTOMS
NAUSEA: 0
SHORTNESS OF BREATH: 0
WHEEZING: 0
VOMITING: 0
CHILLS: 1
HEADACHES: 1
COUGH: 1
DIARRHEA: 0
FEVER: 1
MYALGIAS: 1
SPUTUM PRODUCTION: 0
SORE THROAT: 0

## 2020-12-28 NOTE — PROGRESS NOTES
"Subjective:   Devan Vann is a 40 y.o. male who presents for Fever (body aches, headaches, fatigue, x4 days )      HPI  40-year-old male patient in urgent care for new onset symptoms 4 days ago  Covid exposure: unknown  Over-the-counter medications: advil for mild relief     Review of Systems   Constitutional: Positive for chills, fever and malaise/fatigue.        100.0 TMAX    HENT: Negative for sore throat.    Respiratory: Positive for cough. Negative for sputum production, shortness of breath and wheezing.    Gastrointestinal: Negative for diarrhea, nausea and vomiting.   Musculoskeletal: Positive for myalgias.   Neurological: Positive for headaches.       Patient Active Problem List   Diagnosis   • Obesity (BMI 35.0-39.9 without comorbidity) ([facility])   • HSV-2 infection     Past Surgical History:   Procedure Laterality Date   • PB REVISE MEDIAN N/CARPAL TUNNEL SURG Left 6/2/2020    Procedure: RELEASE, CARPAL TUNNEL;  Surgeon: Tong Engle M.D.;  Location: SURGERY SAME DAY [facility];  Service: Orthopedics   • OTHER ORTHOPEDIC SURGERY      carpel tunnel right hand     Social History     Tobacco Use   • Smoking status: Never Smoker   • Smokeless tobacco: Former User     Types: Chew   • Tobacco comment: very rare chew once per week   Substance Use Topics   • Alcohol use: Yes     Frequency: 2-4 times a month     Drinks per session: 1 or 2     Comment: 6-8 drinks weekly   • Drug use: No      Family History   Problem Relation Age of Onset   • No Known Problems Mother    • Heart Disease Father         MI age mid-50's      (Allergies, Medications, & Tobacco/Substance Use were reconciled by the Medical Assistant and reviewed by myself. The family history is prepopulated)     Objective:     /98 (BP Location: Right arm, Patient Position: Sitting, BP Cuff Size: Adult)   Pulse (!) 122   Temp 37 °C (98.6 °F) (Temporal)   Resp 16   Ht 1.753 m (5' 9\")   Wt 89.3 kg (196 lb 12.8 oz)   SpO2 100%   BMI 29.06 " kg/m²     Physical Exam  Vitals signs reviewed.   HENT:      Right Ear: Tympanic membrane, ear canal and external ear normal.      Left Ear: Tympanic membrane, ear canal and external ear normal.      Nose: Nose normal.      Mouth/Throat:      Lips: Pink.      Mouth: Mucous membranes are moist.      Pharynx: Uvula midline.   Cardiovascular:      Rate and Rhythm: Normal rate and regular rhythm.      Heart sounds: Normal heart sounds.   Pulmonary:      Effort: Pulmonary effort is normal.      Breath sounds: Normal breath sounds.   Neurological:      Mental Status: He is alert and oriented to person, place, and time.   Psychiatric:         Mood and Affect: Mood normal.         Behavior: Behavior normal.         Thought Content: Thought content normal.         Judgment: Judgment normal.         Assessment/Plan:     1. Suspected COVID-19 virus infection  COVID/SARS COV-2 PCR   2. Cough  COVID/SARS COV-2 PCR     Discussed Physical examination findings with patient are likely viral in etiology and could be due to COVID so will perform testing. Advised patient to remain in self isolation until cleared by a negative test or the health department, if they test positive. If exposed to COVID, advised to stay home for 14 days post exposure due to the possibility of developing symptoms day 2-14 post exposure. Will [facility] results to [facility]. Strict ER precautions provided for worsening symptoms including SOB, difficulty breathing or high fever unable to be controlled by OTC tylenol or NSAIDS. Viral illness are largely self limiting and patient should increase fluids using electrolyte enriched beverages as well as OTC medications such as tylenol and ibuprofen per 's instructions.      Appropriate PPE worn at all times by provider. Patient had face mask on for entirety of visit other than during oropharyngeal examination    Work note provided    Differential diagnosis, natural history, supportive care, and indications  for immediate follow-up discussed.    Advised the patient to follow-up with the primary care physician for recheck, reevaluation, and consideration of further management.    Please note that this dictation was created using voice recognition software. I have made a reasonable attempt to correct obvious errors, but I expect that there are errors of grammar and possibly content that I did not discover before finalizing the note.    This note was electronically signed HILARIO Razo

## 2020-12-29 LAB
COVID ORDER STATUS COVID19: NORMAL
SARS-COV-2 RNA RESP QL NAA+PROBE: DETECTED
SPECIMEN SOURCE: ABNORMAL

## 2022-06-23 ENCOUNTER — OFFICE VISIT (OUTPATIENT)
Dept: MEDICAL GROUP | Facility: PHYSICIAN GROUP | Age: 42
End: 2022-06-23
Payer: COMMERCIAL

## 2022-06-23 VITALS
WEIGHT: 203.4 LBS | BODY MASS INDEX: 31.92 KG/M2 | HEART RATE: 97 BPM | HEIGHT: 67 IN | OXYGEN SATURATION: 97 % | TEMPERATURE: 97.6 F | SYSTOLIC BLOOD PRESSURE: 132 MMHG | DIASTOLIC BLOOD PRESSURE: 86 MMHG

## 2022-06-23 DIAGNOSIS — Z00.00 ENCOUNTER FOR MEDICAL EXAMINATION TO ESTABLISH CARE: ICD-10-CM

## 2022-06-23 DIAGNOSIS — B00.9 HSV-2 INFECTION: ICD-10-CM

## 2022-06-23 DIAGNOSIS — G47.00 INSOMNIA, UNSPECIFIED TYPE: ICD-10-CM

## 2022-06-23 DIAGNOSIS — E66.9 OBESITY (BMI 35.0-39.9 WITHOUT COMORBIDITY): ICD-10-CM

## 2022-06-23 PROCEDURE — 99213 OFFICE O/P EST LOW 20 MIN: CPT

## 2022-06-23 RX ORDER — TRAZODONE HYDROCHLORIDE 50 MG/1
50-100 TABLET ORAL NIGHTLY
Qty: 60 TABLET | Refills: 1 | Status: SHIPPED | OUTPATIENT
Start: 2022-06-23 | End: 2022-07-20

## 2022-06-23 ASSESSMENT — PATIENT HEALTH QUESTIONNAIRE - PHQ9: CLINICAL INTERPRETATION OF PHQ2 SCORE: 0

## 2022-06-23 NOTE — PROGRESS NOTES
CC:   Chief Complaint   Patient presents with   • Establish Care   • Medication Refill     trazadone       HPI:  Devan is a pleasant 41 y.o. male here today to establish care and discuss medication refills. His prior PCP was Dr. Debbi Burch.  Patient does not routinely see a provider.  He has no known health conditions and does not take daily medications.  He is not interested in obtaining lab work today.  He is treated for insomnia with trazodone as needed.  He is requesting a refill of this today.    Patient states he falls asleep without issue, but he has difficulty staying asleep.  He is a single father and states that he wakes up easily due to stress and worrying.  He has used trazodone in the past with good results.  He states when taking this medication, he is able to get a full night of uninterrupted sleep.  He usually takes half a tab every 2 weeks.      Patient Active Problem List   Diagnosis   • Obesity (BMI 35.0-39.9 without comorbidity) ([facility])   • HSV-2 infection   • Insomnia     Current Outpatient Medications   Medication Sig Dispense Refill   • traZODone (DESYREL) 50 MG Tab Take 1-2 Tablets by mouth every evening. 60 Tablet 1   • valACYclovir (VALTREX) 500 MG Tab TAKE 1 TAB TWICE DAILY FOR 3 DAYS AS NEEDED FOR OUTBREAK. 30 Tablet 1   • Ascorbic Acid (VITAMIN C) 1000 MG Tab Take 1 Tab by mouth every day.     • Omega-3 Fatty Acids (FISH OIL) 500 MG Cap Take 1 Cap by mouth every day.     • aspirin (ASA) 81 MG Chew Tab chewable tablet Take 81 mg by mouth every day.       No current facility-administered medications for this visit.       Allergies as of 06/23/2022   • (No Known Allergies)        Social History     Socioeconomic History   • Marital status: Single     Spouse name: Not on file   • Number of children: Not on file   • Years of education: Not on file   • Highest education level: Not on file   Occupational History   • Not on file   Tobacco Use   • Smoking status: Never Smoker   • Smokeless  tobacco: Former User     Types: Chew   • Tobacco comment: very rare chew once per week   Vaping Use   • Vaping Use: Never used   Substance and Sexual Activity   • Alcohol use: Yes     Alcohol/week: 1.8 oz     Types: 3 Glasses of wine per week   • Drug use: No   • Sexual activity: Not on file     Family History   Problem Relation Age of Onset   • No Known Problems Mother    • Heart Disease Father         MI age mid-50's       Past Medical History:   Diagnosis Date   • Herpes         Past Surgical History:   Procedure Laterality Date   • PB REVISE MEDIAN N/CARPAL TUNNEL SURG Left 6/2/2020    Procedure: RELEASE, CARPAL TUNNEL;  Surgeon: Tong Engle M.D.;  Location: SURGERY SAME DAY [facility];  Service: Orthopedics   • OTHER ORTHOPEDIC SURGERY      carpel tunnel right hand       Labs: No labs on file to review.    ROS:  Gen: no fevers/chills, no changes in weight  Eyes: no changes in vision  ENT: no sore throat, no hearing loss, no bloody nose  Pulm: no sob, no cough  CV: no chest pain, no palpitations  GI: no nausea/vomiting, no diarrhea  : no dysuria  MSk: no myalgias  Skin: no rash  Neuro: no headaches, no numbness/tingling  Heme/Lymph: no easy bruising      Exam:   Vitals:    06/23/22 1331   BP: 132/86   Pulse: 97   Temp: 36.4 °C (97.6 °F)   SpO2: 97%     Body mass index is 31.86 kg/m².    General: Normal appearing. No distress.  Head: Normocephalic.  Atraumatic.  Eyes: conjunctiva clear, pupils equal and reactive to light accommodation,  Ears: normal shape and contour, canals are clear bilaterally, tympanic membranes are benign  Throat: Oropharynx is without erythema, edema or exudates.   Neck: Supple without JVD or bruit.Thyroid is not enlarged.  Pulmonary: Clear to ausculation.  Normal effort. No rales, ronchi, or wheezing.  Cardiovascular: Regular rate and rhythm without murmur. Carotid and radial pulses are intact and equal bilaterally.  Pedal pulses within normal limits.  Abdomen: Soft, nontender,  nondistended.   Neurologic: Grossly intact.  Sensation intact.  Lymph: No cervical or supraclavicular lymph nodes are palpable.  Skin: Warm and dry.  No obvious lesions.  Musculoskeletal: No extremity cyanosis, clubbing, or edema.  Strength 5+ and equal bilaterally in all extremities.  Psych: Normal mood and affect. Alert and oriented x3. Judgment and insight is normal.    Assessment and Plan.   41 y.o. male presenting with the following.     1. Encounter for medical examination to establish care  Health conditions and medications reviewed and updated. All screenings discussed and up-to-date. Health maintenance completed.     2. Insomnia, unspecified type  Chronic, controlled with medication.    - traZODone (DESYREL) 50 MG Tab; Take 1-2 Tablets by mouth every evening.  Dispense: 60 Tablet; Refill: 1    3. HSV-2 infection  Chronic, controlled.  Had an outbreak about 4 months ago.  He states he notices a correlation between stress and outbreaks.  Continue current management with Valacyclovir as needed.     4. Obesity (BMI 35.0-39.9 without comorbidity) ([facility])  Body mass index is 31.86 kg/m².    Educated in proper administration of medication(s) ordered today including safety, possible SE, risks, benefits, rationale and alternatives to therapy.   Supportive care, differential diagnoses, and indications for immediate follow-up discussed with patient.    Pathogenesis of diagnosis discussed including typical length and natural progression.    Instructed to return to clinic or nearest emergency department for any change in condition, further concerns, or worsening of symptoms.  Patient states understanding of the plan of care and discharge instructions.    Return in about 1 year (around 6/23/2023) for Wellness Visit.    I have placed the above orders and discussed them with an approved delegating provider.  The MA is performing the below orders under the direction of Dr. Jara.    Please note that this dictation was created  using voice recognition software. I have worked with consultants from the vendor as well as technical experts from [facility] to optimize the interface. I have made every reasonable attempt to correct obvious errors, but I expect that there are errors of grammar and possibly content that I did not discover before finalizing the note.

## 2022-06-23 NOTE — ASSESSMENT & PLAN NOTE
Chronic, controlled.  Had an outbreak about 4 months ago.  He states he notices a correlation between stress and outbreaks.  Continue current management with Valacyclovir as needed.

## 2022-06-23 NOTE — ASSESSMENT & PLAN NOTE
Chronic, controlled with medication.  Patient states he falls asleep without issue, but he has difficulty staying asleep.  He is a single father and states that he wakes up easily due to stress and worrying.  He has used trazodone in the past with good results.  He states when taking this medication, he is able to get a full night sleep.  He usually takes half a tab every 2 weeks.  He is requesting a refill today.  Refill provided.

## 2022-07-20 DIAGNOSIS — G47.00 INSOMNIA, UNSPECIFIED TYPE: ICD-10-CM

## 2022-07-20 RX ORDER — TRAZODONE HYDROCHLORIDE 50 MG/1
50-100 TABLET ORAL NIGHTLY
Qty: 60 TABLET | Refills: 1 | Status: SHIPPED | OUTPATIENT
Start: 2022-07-20 | End: 2022-08-03

## 2022-07-31 ENCOUNTER — OFFICE VISIT (OUTPATIENT)
Dept: URGENT CARE | Facility: PHYSICIAN GROUP | Age: 42
End: 2022-07-31
Payer: COMMERCIAL

## 2022-07-31 VITALS
SYSTOLIC BLOOD PRESSURE: 154 MMHG | WEIGHT: 203 LBS | RESPIRATION RATE: 16 BRPM | HEIGHT: 67 IN | HEART RATE: 110 BPM | DIASTOLIC BLOOD PRESSURE: 94 MMHG | TEMPERATURE: 98.3 F | BODY MASS INDEX: 31.86 KG/M2 | OXYGEN SATURATION: 100 %

## 2022-07-31 DIAGNOSIS — J02.9 SORE THROAT: ICD-10-CM

## 2022-07-31 DIAGNOSIS — U07.1 COVID-19 VIRUS INFECTION: ICD-10-CM

## 2022-07-31 LAB
EXTERNAL QUALITY CONTROL: ABNORMAL
INT CON NEG: NEGATIVE
INT CON POS: POSITIVE
S PYO AG THROAT QL: NORMAL
SARS-COV+SARS-COV-2 AG RESP QL IA.RAPID: POSITIVE

## 2022-07-31 PROCEDURE — 99213 OFFICE O/P EST LOW 20 MIN: CPT | Mod: CS | Performed by: PHYSICIAN ASSISTANT

## 2022-07-31 PROCEDURE — 87880 STREP A ASSAY W/OPTIC: CPT | Performed by: PHYSICIAN ASSISTANT

## 2022-07-31 PROCEDURE — 87426 SARSCOV CORONAVIRUS AG IA: CPT | Performed by: PHYSICIAN ASSISTANT

## 2022-07-31 ASSESSMENT — ENCOUNTER SYMPTOMS
SORE THROAT: 1
COUGH: 1
MYALGIAS: 1
SHORTNESS OF BREATH: 0
HEADACHES: 1
SINUS PAIN: 0
SPUTUM PRODUCTION: 1
VOMITING: 0
RHINORRHEA: 1
DIARRHEA: 0
DIZZINESS: 0
BLURRED VISION: 0
PALPITATIONS: 0
EYE PAIN: 0
FEVER: 1
CHILLS: 1
NAUSEA: 0
ABDOMINAL PAIN: 0

## 2022-07-31 NOTE — LETTER
July 31, 2022         Patient: Devan Vann   YOB: 1980   Date of Visit: 7/31/2022           To Whom it May Concern:    Devan Vann was seen in my clinic on 7/31/2022. He tested POSITIVE for COVID-19 virus in the urgent care setting today. He will need to self isolate at home per current CDC guidelines. Thank you for making appropriate accommodations as he recovers.    If you have any questions or concerns, please don't hesitate to call.        Sincerely,           Cheri Monroe P.A.-C.  Electronically Signed

## 2022-07-31 NOTE — PROGRESS NOTES
Subjective     Devan Vann is a 41 y.o. male who presents with Pharyngitis (/X 2 days, Fever Friday afternoon to Saturday morning, headaches, body aches ) and Cough (Friday, Neg covid Friday )      URI   This is a new problem. The current episode started in the past 7 days (started 2 days ago). The problem has been gradually worsening. Maximum temperature: Fever. Only measured to TMax of 99.3 F he has been taking ibuprofen and Tylenol fairly consistently. Associated symptoms include congestion, coughing, headaches, joint swelling, a plugged ear sensation, rhinorrhea and a sore throat. Pertinent negatives include no abdominal pain, chest pain, diarrhea, ear pain, nausea, rash, sinus pain or vomiting. He has tried acetaminophen and NSAIDs for the symptoms. The treatment provided mild relief.   He took a rapid test for COVID-19 virus on Friday which was negative.    Review of Systems   Constitutional: Positive for chills, fever and malaise/fatigue.   HENT: Positive for congestion, rhinorrhea and sore throat. Negative for ear pain and sinus pain.    Eyes: Negative for blurred vision and pain.   Respiratory: Positive for cough and sputum production. Negative for shortness of breath.    Cardiovascular: Negative for chest pain and palpitations.   Gastrointestinal: Negative for abdominal pain, diarrhea, nausea and vomiting.   Musculoskeletal: Positive for myalgias.   Skin: Negative for rash.   Neurological: Positive for headaches. Negative for dizziness.           PMH:  has a past medical history of Herpes.  MEDS:   Current Outpatient Medications:   •  traZODone (DESYREL) 50 MG Tab, TAKE 1-2 TABLETS BY MOUTH EVERY EVENING., Disp: 60 Tablet, Rfl: 1  •  valACYclovir (VALTREX) 500 MG Tab, TAKE 1 TAB TWICE DAILY FOR 3 DAYS AS NEEDED FOR OUTBREAK., Disp: 30 Tablet, Rfl: 1  •  Ascorbic Acid (VITAMIN C) 1000 MG Tab, Take 1 Tab by mouth every day., Disp: , Rfl:   •  Omega-3 Fatty Acids (FISH OIL) 500 MG Cap, Take 1 Cap by  "mouth every day., Disp: , Rfl:   •  aspirin (ASA) 81 MG Chew Tab chewable tablet, Take 81 mg by mouth every day., Disp: , Rfl:   ALLERGIES: No Known Allergies  SURGHX:   Past Surgical History:   Procedure Laterality Date   • PB REVISE MEDIAN N/CARPAL TUNNEL SURG Left 6/2/2020    Procedure: RELEASE, CARPAL TUNNEL;  Surgeon: Tong Engle M.D.;  Location: SURGERY SAME DAY [facility];  Service: Orthopedics   • OTHER ORTHOPEDIC SURGERY      carpel tunnel right hand     SOCHX:  reports that he has never smoked. He has quit using smokeless tobacco.  His smokeless tobacco use included chew. He reports current alcohol use of about 1.8 oz of alcohol per week. He reports that he does not use drugs.  FH: Family history was reviewed, no pertinent findings to report      Objective     BP (!) 154/94 (BP Location: Right arm, Patient Position: Sitting, BP Cuff Size: Adult)   Pulse (!) 110   Temp 36.8 °C (98.3 °F) (Temporal)   Resp 16   Ht 1.702 m (5' 7\")   Wt 92.1 kg (203 lb)   SpO2 100%   BMI 31.79 kg/m²      Physical Exam  Constitutional:       Appearance: He is well-developed. He is ill-appearing.   HENT:      Head: Normocephalic and atraumatic.      Right Ear: Tympanic membrane, ear canal and external ear normal.      Left Ear: Tympanic membrane, ear canal and external ear normal.      Nose: Mucosal edema, congestion and rhinorrhea present. Rhinorrhea is clear.      Mouth/Throat:      Lips: Pink.      Mouth: Mucous membranes are moist.      Pharynx: Oropharynx is clear. Uvula midline. No uvula swelling.   Eyes:      Conjunctiva/sclera: Conjunctivae normal.      Pupils: Pupils are equal, round, and reactive to light.   Cardiovascular:      Rate and Rhythm: Regular rhythm. Bradycardia present.      Heart sounds: Normal heart sounds. No murmur heard.  Pulmonary:      Effort: Pulmonary effort is normal.      Breath sounds: Normal breath sounds. No decreased breath sounds, wheezing, rhonchi or rales.   Musculoskeletal:    "   Cervical back: Normal range of motion.   Lymphadenopathy:      Cervical: No cervical adenopathy.   Skin:     General: Skin is warm and dry.      Capillary Refill: Capillary refill takes less than 2 seconds.   Neurological:      Mental Status: He is alert and oriented to person, place, and time.   Psychiatric:         Behavior: Behavior normal.         Judgment: Judgment normal.       POCT SARS-COV Antigen TERRY (Symptomatic only) - POSITIVE    POCT Rapid Strep A - Negative    Assessment & Plan     1. COVID-19 virus infection  - POCT SARS-COV Antigen TERRY (Symptomatic only)  - OTC cold/flu medications  - PO fluids  - Rest  - Tylenol or ibuprofen as needed for fever > 100.4 F  - Self isolation instructions discussed per current CDC guidelines    2. Sore throat  - POCT Rapid Strep A  - POCT SARS-COV Antigen TERRY (Symptomatic only)  -Supportive care discussed to include salt water gargles, throat lozenges, and increased fluid intake            Differential Diagnosis, natural history, and supportive care discussed. Return to the Urgent Care or follow up with your PCP if symptoms fail to resolve, or for any new or worsening symptoms. Emergency room precautions discussed. Patient and/or family appears understanding of information

## 2022-08-03 DIAGNOSIS — G47.00 INSOMNIA, UNSPECIFIED TYPE: ICD-10-CM

## 2022-08-03 RX ORDER — TRAZODONE HYDROCHLORIDE 50 MG/1
50-100 TABLET ORAL NIGHTLY
Qty: 180 TABLET | Refills: 1 | Status: SHIPPED | OUTPATIENT
Start: 2022-08-03

## 2022-08-05 ENCOUNTER — TELEMEDICINE (OUTPATIENT)
Dept: MEDICAL GROUP | Facility: PHYSICIAN GROUP | Age: 42
End: 2022-08-05
Payer: COMMERCIAL

## 2022-08-05 VITALS — BODY MASS INDEX: 30.01 KG/M2 | HEIGHT: 68 IN | WEIGHT: 198 LBS

## 2022-08-05 DIAGNOSIS — I10 HYPERTENSION, UNSPECIFIED TYPE: ICD-10-CM

## 2022-08-05 PROCEDURE — 99213 OFFICE O/P EST LOW 20 MIN: CPT | Mod: 95

## 2022-08-05 RX ORDER — METOPROLOL SUCCINATE 25 MG/1
25 TABLET, EXTENDED RELEASE ORAL DAILY
Qty: 90 TABLET | Refills: 0 | Status: SHIPPED | OUTPATIENT
Start: 2022-08-05 | End: 2022-09-19 | Stop reason: SDUPTHER

## 2022-08-05 NOTE — PROGRESS NOTES
Virtual Visit: New Patient   This visit was conducted via Zoom using secure and encrypted videoconferencing technology.   The patient was in their home in the state of [place].    The patient's identity was confirmed and verbal consent was obtained for this virtual visit.    Subjective:     CC:   Chief Complaint   Patient presents with   • Medication Refill     Get back on beta blocker, thinks Metoprolol      Devan Vann is a 41 y.o. male presenting to establish care and to discuss the evaluation and management of high blood pressure and high heart rate.  Patient states he was placed on metoprolol several years ago for high blood pressure/heart rate but only took this for short time.  Patient states he has been told over the years that his heart rate is elevated, but he was not interested in restarting medication.  Today, he states that he notices when he gets stressed or upset, he can feel his heart race and can feel his blood pressure increasing.  He states his father is on metoprolol, and he recently tried one of his pills and he felt so much better.  He is wondering if he can restart his metoprolol.    ROS  See HPI    Current medicines (including changes today)  Current Outpatient Medications   Medication Sig Dispense Refill   • metoprolol SR (TOPROL XL) 25 MG TABLET SR 24 HR Take 1 Tablet by mouth every day. 90 Tablet 0   • traZODone (DESYREL) 50 MG Tab TAKE 1-2 TABLETS BY MOUTH EVERY EVENING. 180 Tablet 1   • valACYclovir (VALTREX) 500 MG Tab TAKE 1 TAB TWICE DAILY FOR 3 DAYS AS NEEDED FOR OUTBREAK. 30 Tablet 1   • Ascorbic Acid (VITAMIN C) 1000 MG Tab Take 1 Tab by mouth every day.     • Omega-3 Fatty Acids (FISH OIL) 500 MG Cap Take 1 Cap by mouth every day.     • aspirin (ASA) 81 MG Chew Tab chewable tablet Take 81 mg by mouth every day.       No current facility-administered medications for this visit.       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  Skin: No rashes in visible areas.  Eye: Round. Conjunctiva clear, lids normal. No icterus.   ENMT: Lips pink without lesions, good dentition, moist mucous membranes. Phonation normal.  Neck: No masses, no thyromegaly. Moves freely without pain.  Respiratory: Unlabored respiratory effort, no cough or audible wheeze  Psych: Alert and oriented x3, normal affect and mood.     Assessment and Plan:   The following treatment plan was discussed:     1. Hypertension, unspecified type  Chronic, not controlled.  Since today's visit is a virtual visit, I do not have accurate vitals to review.  However, I looked back over his clinic visits over the last 2 years and it does show he has elevated systolic blood pressures ranging 130s-150s with heart rate 80s-105.   I agreed to place the patient back on metoprolol but encouraged him to return in clinic in 4 weeks for a check of his blood pressure and heart rate.  He is agreeable.  - metoprolol SR (TOPROL XL) 25 MG TABLET SR 24 HR; Take 1 Tablet by mouth every day.  Dispense: 90 Tablet; Refill: 0    Follow-up: Return in about 4 weeks (around 9/2/2022) for Follow-up BP.         "

## 2022-08-05 NOTE — ASSESSMENT & PLAN NOTE
Since today's visit is a virtual visit, I do not have accurate vitals to review.  However, I looked back over his clinic visits over the last 2 years and it does show he has elevated systolic blood pressures ranging 130s-150s with heart rate 80s-105.   I agreed to place the patient back on metoprolol but encouraged him to return in clinic in 4 weeks for a check of his blood pressure and heart rate.  He is agreeable.

## 2022-09-08 ENCOUNTER — NON-PROVIDER VISIT (OUTPATIENT)
Dept: MEDICAL GROUP | Facility: PHYSICIAN GROUP | Age: 42
End: 2022-09-08
Payer: COMMERCIAL

## 2022-09-08 VITALS
HEIGHT: 67 IN | DIASTOLIC BLOOD PRESSURE: 98 MMHG | WEIGHT: 194.2 LBS | SYSTOLIC BLOOD PRESSURE: 172 MMHG | HEART RATE: 106 BPM | BODY MASS INDEX: 30.48 KG/M2 | TEMPERATURE: 98.1 F | OXYGEN SATURATION: 100 %

## 2022-09-08 DIAGNOSIS — I10 HYPERTENSION, UNSPECIFIED TYPE: ICD-10-CM

## 2022-09-08 DIAGNOSIS — Z91.89 OTHER SPECIFIED PERSONAL RISK FACTORS, NOT ELSEWHERE CLASSIFIED: ICD-10-CM

## 2022-09-08 DIAGNOSIS — Z00.00 ENCOUNTER FOR MEDICAL EXAMINATION TO ESTABLISH CARE: ICD-10-CM

## 2022-09-08 RX ORDER — AMLODIPINE BESYLATE 5 MG/1
5 TABLET ORAL DAILY
Qty: 90 TABLET | Refills: 3 | Status: SHIPPED | OUTPATIENT
Start: 2022-09-08 | End: 2022-11-30 | Stop reason: SDUPTHER

## 2022-09-14 ENCOUNTER — HOSPITAL ENCOUNTER (OUTPATIENT)
Dept: LAB | Facility: MEDICAL CENTER | Age: 42
End: 2022-09-14
Payer: COMMERCIAL

## 2022-09-14 DIAGNOSIS — Z00.00 ENCOUNTER FOR MEDICAL EXAMINATION TO ESTABLISH CARE: ICD-10-CM

## 2022-09-14 LAB
25(OH)D3 SERPL-MCNC: 43 NG/ML (ref 30–100)
ALBUMIN SERPL BCP-MCNC: 4.7 G/DL (ref 3.2–4.9)
ALBUMIN/GLOB SERPL: 2 G/DL
ALP SERPL-CCNC: 43 U/L (ref 30–99)
ALT SERPL-CCNC: 51 U/L (ref 2–50)
ANION GAP SERPL CALC-SCNC: 9 MMOL/L (ref 7–16)
AST SERPL-CCNC: 38 U/L (ref 12–45)
BASOPHILS # BLD AUTO: 0.7 % (ref 0–1.8)
BASOPHILS # BLD: 0.06 K/UL (ref 0–0.12)
BILIRUB SERPL-MCNC: 0.3 MG/DL (ref 0.1–1.5)
BUN SERPL-MCNC: 14 MG/DL (ref 8–22)
CALCIUM SERPL-MCNC: 8.8 MG/DL (ref 8.5–10.5)
CHLORIDE SERPL-SCNC: 104 MMOL/L (ref 96–112)
CHOLEST SERPL-MCNC: 218 MG/DL (ref 100–199)
CO2 SERPL-SCNC: 23 MMOL/L (ref 20–33)
CREAT SERPL-MCNC: 0.95 MG/DL (ref 0.5–1.4)
EOSINOPHIL # BLD AUTO: 0.06 K/UL (ref 0–0.51)
EOSINOPHIL NFR BLD: 0.7 % (ref 0–6.9)
ERYTHROCYTE [DISTWIDTH] IN BLOOD BY AUTOMATED COUNT: 42.3 FL (ref 35.9–50)
EST. AVERAGE GLUCOSE BLD GHB EST-MCNC: 111 MG/DL
FASTING STATUS PATIENT QL REPORTED: NORMAL
FOLATE SERPL-MCNC: 28.1 NG/ML
GFR SERPLBLD CREATININE-BSD FMLA CKD-EPI: 104 ML/MIN/1.73 M 2
GLOBULIN SER CALC-MCNC: 2.3 G/DL (ref 1.9–3.5)
GLUCOSE SERPL-MCNC: 96 MG/DL (ref 65–99)
HBA1C MFR BLD: 5.5 % (ref 4–5.6)
HCT VFR BLD AUTO: 50.2 % (ref 42–52)
HDLC SERPL-MCNC: 63 MG/DL
HGB BLD-MCNC: 16.8 G/DL (ref 14–18)
IMM GRANULOCYTES # BLD AUTO: 0.1 K/UL (ref 0–0.11)
IMM GRANULOCYTES NFR BLD AUTO: 1.2 % (ref 0–0.9)
LDLC SERPL CALC-MCNC: 110 MG/DL
LYMPHOCYTES # BLD AUTO: 1.77 K/UL (ref 1–4.8)
LYMPHOCYTES NFR BLD: 22.1 % (ref 22–41)
MCH RBC QN AUTO: 29 PG (ref 27–33)
MCHC RBC AUTO-ENTMCNC: 33.5 G/DL (ref 33.7–35.3)
MCV RBC AUTO: 86.7 FL (ref 81.4–97.8)
MONOCYTES # BLD AUTO: 0.72 K/UL (ref 0–0.85)
MONOCYTES NFR BLD AUTO: 9 % (ref 0–13.4)
NEUTROPHILS # BLD AUTO: 5.31 K/UL (ref 1.82–7.42)
NEUTROPHILS NFR BLD: 66.3 % (ref 44–72)
NRBC # BLD AUTO: 0 K/UL
NRBC BLD-RTO: 0 /100 WBC
PLATELET # BLD AUTO: 272 K/UL (ref 164–446)
PMV BLD AUTO: 10.5 FL (ref 9–12.9)
POTASSIUM SERPL-SCNC: 4.2 MMOL/L (ref 3.6–5.5)
PROT SERPL-MCNC: 7 G/DL (ref 6–8.2)
RBC # BLD AUTO: 5.79 M/UL (ref 4.7–6.1)
SODIUM SERPL-SCNC: 136 MMOL/L (ref 135–145)
TRIGL SERPL-MCNC: 225 MG/DL (ref 0–149)
TSH SERPL DL<=0.005 MIU/L-ACNC: 1.02 UIU/ML (ref 0.38–5.33)
VIT B12 SERPL-MCNC: 406 PG/ML (ref 211–911)
WBC # BLD AUTO: 8 K/UL (ref 4.8–10.8)

## 2022-09-14 PROCEDURE — 80061 LIPID PANEL: CPT

## 2022-09-14 PROCEDURE — 84443 ASSAY THYROID STIM HORMONE: CPT

## 2022-09-14 PROCEDURE — 83036 HEMOGLOBIN GLYCOSYLATED A1C: CPT

## 2022-09-14 PROCEDURE — 82746 ASSAY OF FOLIC ACID SERUM: CPT

## 2022-09-14 PROCEDURE — 80053 COMPREHEN METABOLIC PANEL: CPT

## 2022-09-14 PROCEDURE — 82306 VITAMIN D 25 HYDROXY: CPT

## 2022-09-14 PROCEDURE — 85025 COMPLETE CBC W/AUTO DIFF WBC: CPT

## 2022-09-14 PROCEDURE — 82607 VITAMIN B-12: CPT

## 2022-09-14 PROCEDURE — 36415 COLL VENOUS BLD VENIPUNCTURE: CPT

## 2022-09-19 ENCOUNTER — OFFICE VISIT (OUTPATIENT)
Dept: MEDICAL GROUP | Facility: PHYSICIAN GROUP | Age: 42
End: 2022-09-19
Payer: COMMERCIAL

## 2022-09-19 VITALS
SYSTOLIC BLOOD PRESSURE: 142 MMHG | WEIGHT: 193.6 LBS | HEART RATE: 115 BPM | OXYGEN SATURATION: 96 % | HEIGHT: 67 IN | BODY MASS INDEX: 30.39 KG/M2 | DIASTOLIC BLOOD PRESSURE: 90 MMHG | TEMPERATURE: 97.5 F

## 2022-09-19 DIAGNOSIS — R07.89 OTHER CHEST PAIN: ICD-10-CM

## 2022-09-19 DIAGNOSIS — I10 HYPERTENSION, UNSPECIFIED TYPE: ICD-10-CM

## 2022-09-19 PROCEDURE — 93000 ELECTROCARDIOGRAM COMPLETE: CPT

## 2022-09-19 PROCEDURE — 99215 OFFICE O/P EST HI 40 MIN: CPT

## 2022-09-19 RX ORDER — OLMESARTAN MEDOXOMIL 20 MG/1
20 TABLET ORAL DAILY
Qty: 90 TABLET | Refills: 0 | Status: SHIPPED | OUTPATIENT
Start: 2022-09-19 | End: 2022-11-30 | Stop reason: SDUPTHER

## 2022-09-19 RX ORDER — METOPROLOL SUCCINATE 50 MG/1
50 TABLET, EXTENDED RELEASE ORAL DAILY
Qty: 60 TABLET | Refills: 0 | Status: SHIPPED | OUTPATIENT
Start: 2022-09-19 | End: 2022-11-02

## 2022-09-19 RX ORDER — OLMESARTAN MEDOXOMIL 20 MG/1
20 TABLET ORAL DAILY
Qty: 90 TABLET | Refills: 0 | Status: SHIPPED | OUTPATIENT
Start: 2022-09-19 | End: 2022-09-19

## 2022-09-19 ASSESSMENT — FIBROSIS 4 INDEX: FIB4 SCORE: 0.76

## 2022-09-19 NOTE — ASSESSMENT & PLAN NOTE
Chronic, not controlled.  BP in clinic is 142/90  HR is 115  EKG in clinic shows Sinus Tach () with widened QRS in V1 (RBBB).  Will increase metoprolol to 50 mg to help with HR.  Will add Olmesartan 20 mg for added BP support.  Continue Amlodipine 5 mg.  Patient to return in 4 weeks for follow-up

## 2022-09-19 NOTE — PROGRESS NOTES
"Subjective:     CC:   Chief Complaint   Patient presents with    Lab Results       HISTORY OF THE PRESENT ILLNESS: Devan is a pleasant 41 y.o. male here today to follow-up his labs and his blood pressure as well as new chest pain on exertion.    Problem   Other Chest Pain    Patient states he occasionally has chest pain with physical exertion.   He has had two episodes this last month.   His father and grandfather both have a history of MIs.  He is inquiring about a stress test.     High Blood Pressure    Patient is here to follow-up on his blood pressure after starting Metoprolol about six weeks ago for concerns of elevated blood pressure and heart rate. Patient expressed concerns during a telemed visit stating he was on metoprolol several years ago for high blood pressure/heart rate but only took this for short time.    In clinic today, his blood pressure and heart rate are still elevated.  He is currently managed on metoprolol 25 mg daily as well as amlodipine 5 mg daily.    He is also expressing concern about a family history of heart disease and is inquiring about getting established with a cardiologist.         Health Maintenance: Completed    ROS:  All systems negative expect as addressed in assessment and plan.     Objective:     Exam:  BP (!) 142/90 (BP Location: Right arm, Patient Position: Sitting, BP Cuff Size: Adult)   Pulse (!) 115   Temp 36.4 °C (97.5 °F) (Temporal)   Ht 1.702 m (5' 7\")   Wt 87.8 kg (193 lb 9.6 oz)   SpO2 96%   BMI 30.32 kg/m²  Body mass index is 30.32 kg/m².    Physical Exam  Constitutional:       Appearance: Normal appearance.   Cardiovascular:      Rate and Rhythm: Tachycardia present.      Pulses: Normal pulses.      Heart sounds: Normal heart sounds.   Pulmonary:      Effort: Pulmonary effort is normal.   Neurological:      Mental Status: He is alert. Mental status is at baseline.   Psychiatric:         Mood and Affect: Mood normal.         Behavior: Behavior normal. "       Labs: Results reviewed from 09/14/22    Assessment & Plan:     41 y.o. male with the following -  1. Hypertension, unspecified type  Chronic, not controlled.  BP in clinic is 142/90  HR is 115  EKG in clinic shows Sinus Tach () with widened QRS in V1 (RBBB).  Will increase metoprolol to 50 mg to help with HR.  Will add Olmesartan 20 mg for added BP support.  Continue Amlodipine 5 mg.  Patient to return in 4 weeks for follow-up   - REFERRAL TO CARDIOLOGY  - EKG - Clinic Performed  - olmesartan (BENICAR) 20 MG Tab; Take 1 Tablet by mouth every day.  Dispense: 90 Tablet; Refill: 0  - metoprolol SR (TOPROL XL) 50 MG TABLET SR 24 HR; Take 1 Tablet by mouth every day.  Dispense: 60 Tablet; Refill: 0    2. Other chest pain  New problem.  EKG shows ST () with RBBB.  Order for stress test and referral to Cardiology.    - REFERRAL TO CARDIOLOGY  - STRESS TEST  - EKG - Clinic Performed  - olmesartan (BENICAR) 20 MG Tab; Take 1 Tablet by mouth every day.  Dispense: 90 Tablet; Refill: 0  - metoprolol SR (TOPROL XL) 50 MG TABLET SR 24 HR; Take 1 Tablet by mouth every day.  Dispense: 60 Tablet; Refill: 0    Patient was educated in proper administration of medication(s) ordered today including safety, possible SE, risks, benefits, rationale and alternatives to therapy.   Supportive care, differential diagnoses, and indications for immediate follow-up discussed with patient.    Pathogenesis of diagnosis discussed including typical length and natural progression.    Instructed to return to clinic or nearest emergency department for any change in condition, further concerns, or worsening of symptoms.  Patient states understanding of the plan of care and discharge instructions.    Return in about 4 weeks (around 10/17/2022) for Blood pressure and new medication.    I spent a total of 40 minutes with record review, exam, and communication with the patient, communication with other providers, and documentation of this  encounter. This does not include time spent on separately billable procedures/tests.    I have placed the above orders and discussed them with an approved delegating provider.  The MA is performing the below orders under the direction of Dr. Jara.    Please note that this dictation was created using voice recognition software. I have worked with consultants from the vendor as well as technical experts from [facility] to optimize the interface. I have made every reasonable attempt to correct obvious errors, but I expect that there are errors of grammar and possibly content that I did not discover before finalizing the note.

## 2022-09-21 ENCOUNTER — OFFICE VISIT (OUTPATIENT)
Dept: CARDIOLOGY | Facility: MEDICAL CENTER | Age: 42
End: 2022-09-21
Payer: COMMERCIAL

## 2022-09-21 VITALS
OXYGEN SATURATION: 99 % | RESPIRATION RATE: 18 BRPM | DIASTOLIC BLOOD PRESSURE: 90 MMHG | SYSTOLIC BLOOD PRESSURE: 128 MMHG | BODY MASS INDEX: 31.64 KG/M2 | HEIGHT: 67 IN | WEIGHT: 201.6 LBS | HEART RATE: 95 BPM

## 2022-09-21 DIAGNOSIS — R07.89 OTHER CHEST PAIN: ICD-10-CM

## 2022-09-21 PROCEDURE — 99204 OFFICE O/P NEW MOD 45 MIN: CPT | Performed by: PHYSICIAN ASSISTANT

## 2022-09-21 ASSESSMENT — FIBROSIS 4 INDEX: FIB4 SCORE: 0.76

## 2022-09-21 NOTE — PROGRESS NOTES
"      CARDIOLOGY CONSULTATION NOTE      Date of Consultation: 9/21/2022  Referring Provider:Mauro Anguiano APRN    Patient Name: Devan Vann  YOB: 1980  MRN: [id number]    Reason for Consultation   Chest Pain, Hypertension    History of Present Illness   Devan Vann is a 41 year-old male with hypertension and family history of coronary artery disease.     Devan was first diagnosed with hypertension \"about 10 years ago\" at that time he was prescribed lisinopril and metoprolol  but didn't take it for long because it made him feel poorly. He did start taking a baby aspirin at that time and he made a conscious effort to stay healthy by going to the gym. Then, a couple weeks ago a friend his age had a heart attack so he has been more pro active for this health. He established with Mauro Pimentel who started him on amlodipine for hypertension and checked a full set of labs. He returned to her office and reported some chest pressure. She started him on metoprolol, olmesartan and ordered a coronary calcium CT as well as treadmill stress test.     Devan got Covid about a month ago and ever since then he has noticed his heart rate has been around 100-105 on his smart watch. He feels palpitations and notes that the metoprolol is helping. Once he was feeling better he went back to the gym and while he was on the treadmill he developed chest pain, located on the right side, it was a dull ache with no radiation. He rested and the pain went away. Following this he had another similar episode of the chest pressure during intercourse. This also improved with rest.     Devan does not have any personal cardiac history. He has carpal tunnel requiring surgical release. His father required triple bypass surgery at age 55, grandfather had sudden cardiac death at age 50.     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 He does not smoke cigarettes, he uses nicotine pouches (kind of like chew) 5-6/day " ongoing for over 10 years. He also drinks 2-3 drinks per day.     Medical and Surgical History     Past Medical History:   Diagnosis Date    Herpes     Hypertension      Past Surgical History:   Procedure Laterality Date    OR NEUROPLASTY & OR TRANSPOS MEDIAN NRV CARPAL CHIQUI Left 6/2/2020    Procedure: RELEASE, CARPAL TUNNEL;  Surgeon: Tong Engle M.D.;  Location: SURGERY SAME DAY [facility];  Service: Orthopedics    OTHER ORTHOPEDIC SURGERY      carpel tunnel right hand       Family History     Family History   Problem Relation Age of Onset    No Known Problems Mother     Heart Disease Father         MI age mid-50's    Heart Disease Paternal Grandfather        Social History   He reports that he does not use drugs.  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 He does not smoke cigarettes, he uses nicotine pouches (kind of like chew) 5-6/day ongoing for over 10 years. He also drinks 2-3 drinks per day.     Medications and Allergies     Current Outpatient Medications   Medication Instructions    amLODIPine (NORVASC) 5 mg, Oral, DAILY    Ascorbic Acid (VITAMIN C) 1000 MG Tab 1 Tablet, Oral, DAILY    aspirin (ASA) 81 mg, Oral, DAILY    metoprolol SR (TOPROL XL) 50 mg, Oral, DAILY    olmesartan (BENICAR) 20 mg, Oral, DAILY    Omega-3 Fatty Acids (FISH OIL) 500 MG Cap 1 Capsule, Oral, DAILY    traZODone (DESYREL)  mg, Oral, NIGHTLY    valACYclovir (VALTREX) 500 MG Tab TAKE 1 TAB TWICE DAILY FOR 3 DAYS AS NEEDED FOR OUTBREAK.       No Known Allergies    Review of Systems   A pertinent review of systems was performed and was unremarkable except as per HPI above.  Review of Systems   Constitutional:  Negative for chills, diaphoresis, fever and malaise/fatigue.   Respiratory:  Negative for cough and shortness of breath.    Cardiovascular:  Positive for chest pain and palpitations. Negative for leg swelling and PND.   Musculoskeletal:  Positive for joint pain.   Neurological:  Negative for dizziness and loss of  "consciousness.   Endo/Heme/Allergies:         Heat intolerance       Physical Examination   BP (!) 128/90 (BP Location: Left arm, Patient Position: Sitting, BP Cuff Size: Adult)   Pulse 95   Resp 18   Ht 1.702 m (5' 7\")   Wt 91.4 kg (201 lb 9.6 oz)   SpO2 99%   BMI 31.58 kg/m²   Vitals:    09/21/22 1545   BP: (!) 128/90   BP Location: Left arm   Patient Position: Sitting   BP Cuff Size: Adult   Pulse: 95   Resp: 18   SpO2: 99%   Weight: 91.4 kg (201 lb 9.6 oz)   Height: 1.702 m (5' 7\")     Body mass index is 31.58 kg/m².    Physical Exam  Vitals reviewed.   HENT:      Head: Normocephalic and atraumatic.   Cardiovascular:      Rate and Rhythm: Regular rhythm. Tachycardia present.      Heart sounds: No murmur heard.     Comments: Radial pulses 2+ bilaterally  PT pulses 2+ bilaterally      Pulmonary:      Effort: Pulmonary effort is normal.      Breath sounds: No wheezing or rales.   Abdominal:      Comments: Central obesity   Musculoskeletal:      Right lower leg: No edema.      Left lower leg: No edema.   Neurological:      Mental Status: He is alert. Mental status is at baseline.      Gait: Gait normal.   Psychiatric:         Judgment: Judgment normal.         Laboratories:     Lab Results   Component Value Date/Time     (H) 09/14/2022 0810     Lab Results   Component Value Date/Time    HDL 63 09/14/2022 0810       Lab Results   Component Value Date/Time    TRIGLYCERIDE 225 (H) 09/14/2022 0810       Lab Results   Component Value Date/Time    CHOLSTRLTOT 218 (H) 09/14/2022 0810       Studies:     EKG 9/19/22  Sinus rhythm, normal axis    Labs 9/14/22  -  HDL 63    Normal CBC and CMP    Assessment and Recommendations:   Chest pain  Hyperlipidemia  Family history of early CAD  - I agree with treadmill EKG stress test which I have reordered. His calculated ASCVD Risk is low due to his age but does not take into account his family history. Coronary calcium CT will be more helpful in 10yr " risk assessment, if he has anything over a 0 then will likely recommend statin therapy along with lifestyle management   - aspirin 81mg for now is fine  - continue Metoprolol, this may be able to be titrated down if his stress test is normal and his palpitations resolve as he recovers from his covid infection.     Stage 2 Hypertension  - Olmesartan and amlodipine is a good group regimen for him. I strongly encourage Devan to quit nicotine products as this is likely causing vasoconstriction, increasing his blood pressure. We'll check his blood pressures over 3 mo and adjust meds accordingly.   - if his blood pressure does not improve with reduction in nicotine I will order labs to rule out secondary hypertension.       Thank you for allowing me to participate in the care of this patient. I will see him back in 3 months and will contact him regarding the results of his testing. Please contact me with any questions.    Comfort Clark PA-C  [facility]  [facility], [address]  [address] [place] [id number]  Phone: [phone]

## 2022-09-22 ENCOUNTER — HOSPITAL ENCOUNTER (OUTPATIENT)
Dept: RADIOLOGY | Facility: MEDICAL CENTER | Age: 42
End: 2022-09-22
Payer: COMMERCIAL

## 2022-09-22 DIAGNOSIS — Z00.00 ENCOUNTER FOR MEDICAL EXAMINATION TO ESTABLISH CARE: ICD-10-CM

## 2022-09-22 DIAGNOSIS — Z91.89 OTHER SPECIFIED PERSONAL RISK FACTORS, NOT ELSEWHERE CLASSIFIED: ICD-10-CM

## 2022-09-22 PROCEDURE — 4410556 CT-CARDIAC SCORING (SELF PAY ONLY)

## 2022-09-22 ASSESSMENT — ENCOUNTER SYMPTOMS
DIAPHORESIS: 0
COUGH: 0
DIZZINESS: 0
LOSS OF CONSCIOUSNESS: 0
CHILLS: 0
PND: 0
FEVER: 0
PALPITATIONS: 1
SHORTNESS OF BREATH: 0

## 2022-09-26 ENCOUNTER — TELEPHONE (OUTPATIENT)
Dept: CARDIOLOGY | Facility: MEDICAL CENTER | Age: 42
End: 2022-09-26
Payer: COMMERCIAL

## 2022-09-26 DIAGNOSIS — E78.5 DYSLIPIDEMIA: ICD-10-CM

## 2022-09-26 NOTE — TELEPHONE ENCOUNTER
AREN      Caller: Devan Vann    Topic/issue: Patient received the results of his Calcium Scoring and was asking for a call back to discuss it  Callback Number: [phone] (home)         Thank you    -Jameson CASE

## 2022-09-26 NOTE — TELEPHONE ENCOUNTER
Pt completed calcium scoring which was ordered by outside provider.     Total Calcium Score: 59.1 (LAD)    S/w pt and explained that Comfort Clark PA-C was OOO but will inform him when provider is able to advise on starting statin therapy.     Pt will call soon to schedule treadmill stress test.     Facundo YOUNGER

## 2022-09-27 RX ORDER — ROSUVASTATIN CALCIUM 10 MG/1
10 TABLET, COATED ORAL EVERY EVENING
Qty: 90 TABLET | Refills: 3 | Status: SHIPPED | OUTPATIENT
Start: 2022-09-27

## 2022-11-01 DIAGNOSIS — I10 HYPERTENSION, UNSPECIFIED TYPE: ICD-10-CM

## 2022-11-02 RX ORDER — METOPROLOL SUCCINATE 25 MG/1
50 TABLET, EXTENDED RELEASE ORAL DAILY
Qty: 180 TABLET | Refills: 0 | Status: SHIPPED | OUTPATIENT
Start: 2022-11-02 | End: 2023-02-16

## 2022-11-21 ENCOUNTER — APPOINTMENT (OUTPATIENT)
Dept: RADIOLOGY | Facility: MEDICAL CENTER | Age: 42
End: 2022-11-21
Attending: PHYSICIAN ASSISTANT
Payer: COMMERCIAL

## 2022-11-28 ENCOUNTER — HOSPITAL ENCOUNTER (OUTPATIENT)
Dept: RADIOLOGY | Facility: MEDICAL CENTER | Age: 42
End: 2022-11-28
Attending: PHYSICIAN ASSISTANT
Payer: COMMERCIAL

## 2022-11-28 PROCEDURE — 93017 CV STRESS TEST TRACING ONLY: CPT

## 2022-11-28 PROCEDURE — 93018 CV STRESS TEST I&R ONLY: CPT | Performed by: INTERNAL MEDICINE

## 2022-11-29 NOTE — RESULT ENCOUNTER NOTE
Devan,   Thanks for completing the stress test. Good work! You exercised for 10 minutes which makes the test very sensitive. There were no changes in your EKG suggesting any blocked heart arteries. I do still recommend the statin medication for future risk reduction. We can talk about this more at your visit in January.     Comfort

## 2022-11-30 DIAGNOSIS — B00.9 HSV-2 INFECTION: ICD-10-CM

## 2022-11-30 DIAGNOSIS — I10 HYPERTENSION, UNSPECIFIED TYPE: ICD-10-CM

## 2022-11-30 RX ORDER — VALACYCLOVIR HYDROCHLORIDE 500 MG/1
TABLET, FILM COATED ORAL
Qty: 30 TABLET | Refills: 0 | Status: SHIPPED | OUTPATIENT
Start: 2022-11-30 | End: 2023-07-28 | Stop reason: SDUPTHER

## 2022-11-30 RX ORDER — AMLODIPINE BESYLATE 5 MG/1
5 TABLET ORAL DAILY
Qty: 90 TABLET | Refills: 3 | Status: SHIPPED | OUTPATIENT
Start: 2022-11-30 | End: 2022-12-07 | Stop reason: SDUPTHER

## 2022-11-30 RX ORDER — OLMESARTAN MEDOXOMIL 20 MG/1
20 TABLET ORAL DAILY
Qty: 90 TABLET | Refills: 3 | Status: SHIPPED | OUTPATIENT
Start: 2022-11-30

## 2022-11-30 NOTE — TELEPHONE ENCOUNTER
Received request via: Pharmacy    Was the patient seen in the last year in this department? Yes    Does the patient have an active prescription (recently filled or refills available) for medication(s) requested? No    Does the patient have [facility] Plus and need 100 day supply (blood pressure, diabetes and cholesterol meds only)? Patient does not have SCP

## 2022-12-07 DIAGNOSIS — I10 HYPERTENSION, UNSPECIFIED TYPE: ICD-10-CM

## 2022-12-07 RX ORDER — AMLODIPINE BESYLATE 5 MG/1
5 TABLET ORAL DAILY
Qty: 90 TABLET | Refills: 3 | Status: SHIPPED | OUTPATIENT
Start: 2022-12-07

## 2023-01-27 ENCOUNTER — TELEPHONE (OUTPATIENT)
Dept: CARDIOLOGY | Facility: MEDICAL CENTER | Age: 43
End: 2023-01-27
Payer: COMMERCIAL

## 2023-02-10 DIAGNOSIS — I10 HYPERTENSION, UNSPECIFIED TYPE: ICD-10-CM

## 2023-02-14 ENCOUNTER — OFFICE VISIT (OUTPATIENT)
Dept: URGENT CARE | Facility: PHYSICIAN GROUP | Age: 43
End: 2023-02-14
Payer: COMMERCIAL

## 2023-02-14 VITALS
HEART RATE: 99 BPM | RESPIRATION RATE: 20 BRPM | BODY MASS INDEX: 30.31 KG/M2 | TEMPERATURE: 98.1 F | HEIGHT: 68 IN | OXYGEN SATURATION: 98 % | WEIGHT: 200 LBS | DIASTOLIC BLOOD PRESSURE: 88 MMHG | SYSTOLIC BLOOD PRESSURE: 122 MMHG

## 2023-02-14 DIAGNOSIS — J01.90 ACUTE BACTERIAL SINUSITIS: ICD-10-CM

## 2023-02-14 DIAGNOSIS — B96.89 ACUTE BACTERIAL SINUSITIS: ICD-10-CM

## 2023-02-14 PROCEDURE — 99213 OFFICE O/P EST LOW 20 MIN: CPT | Performed by: STUDENT IN AN ORGANIZED HEALTH CARE EDUCATION/TRAINING PROGRAM

## 2023-02-14 RX ORDER — AMOXICILLIN AND CLAVULANATE POTASSIUM 875; 125 MG/1; MG/1
1 TABLET, FILM COATED ORAL 2 TIMES DAILY
Qty: 10 TABLET | Refills: 0 | Status: SHIPPED | OUTPATIENT
Start: 2023-02-14 | End: 2023-02-19

## 2023-02-14 ASSESSMENT — FIBROSIS 4 INDEX: FIB4 SCORE: 0.78

## 2023-02-14 NOTE — PROGRESS NOTES
"Subjective:   Devan Vann is a 42 y.o. male who presents for Sinus Problem (Congestion,sinus pressure,cough,x1 week)      HPI:  Pleasant 42-year-old male presents to the urgent care for 7 days of worsening sinus pressure, sinus pain, and nasal congestion.  He also reports a slight dry cough but he believes this is due to postnasal drip.  He does have history of sinus infections and reports this feels exactly like those past experiences.  He has been using Flonase and Mucinex without relief.  Denies fever, chills, nausea, vomiting, chest pain, shortness of breath, sputum reduction, ear pain, ear discharge, dizziness, or headache.    Medications:    amLODIPine Tabs  amoxicillin-clavulanate Tabs  aspirin Chew  Fish Oil Caps  metoprolol SR Tb24  olmesartan Tabs  rosuvastatin Tabs  traZODone Tabs  valACYclovir Tabs  Vitamin C Tabs    Allergies: Patient has no known allergies.    Problem List: Devan Vann does not have any pertinent problems on file.    Surgical History:  Past Surgical History:   Procedure Laterality Date    NM NEUROPLASTY & OR TRANSPOS MEDIAN NRV CARPAL CHIQUI Left 6/2/2020    Procedure: RELEASE, CARPAL TUNNEL;  Surgeon: Tong Engle M.D.;  Location: SURGERY SAME DAY [facility];  Service: Orthopedics    OTHER ORTHOPEDIC SURGERY      carpel tunnel right hand       Past Social Hx: Devan Vann  reports that he has never smoked. His smokeless tobacco use includes chew. He reports current alcohol use of about 1.8 oz per week. He reports that he does not use drugs.     Past Family Hx:  Devan Vann family history includes Heart Disease in his father and paternal grandfather; No Known Problems in his mother.     Problem list, medications, and allergies reviewed by myself today in Epic.     Objective:     /88 (BP Location: Right arm, Patient Position: Sitting, BP Cuff Size: Adult)   Pulse 99   Temp 36.7 °C (98.1 °F) (Temporal)   Resp 20   Ht 1.727 m (5' 8\")   Wt 90.7 " kg (200 lb)   SpO2 98%   BMI 30.41 kg/m²     Physical Exam  Vitals reviewed.   Constitutional:       General: He is not in acute distress.     Appearance: Normal appearance.   HENT:      Head: Normocephalic.      Right Ear: Tympanic membrane, ear canal and external ear normal.      Left Ear: Tympanic membrane, ear canal and external ear normal.      Nose: Mucosal edema and congestion present.      Right Nostril: No septal hematoma.      Left Nostril: No septal hematoma.      Right Sinus: Maxillary sinus tenderness and frontal sinus tenderness present.      Left Sinus: Maxillary sinus tenderness and frontal sinus tenderness present.      Mouth/Throat:      Mouth: Mucous membranes are moist.   Eyes:      Conjunctiva/sclera: Conjunctivae normal.      Pupils: Pupils are equal, round, and reactive to light.   Cardiovascular:      Rate and Rhythm: Normal rate and regular rhythm.      Pulses: Normal pulses.      Heart sounds: Normal heart sounds. No murmur heard.  Pulmonary:      Effort: Pulmonary effort is normal. No respiratory distress.      Breath sounds: Normal breath sounds. No stridor. No wheezing, rhonchi or rales.   Musculoskeletal:      Cervical back: Normal range of motion.   Lymphadenopathy:      Cervical: No cervical adenopathy.   Neurological:      Mental Status: He is alert.       Assessment/Plan:     Diagnosis and associated orders:     1. Acute bacterial sinusitis  amoxicillin-clavulanate (AUGMENTIN) 875-125 MG Tab         Comments/MDM:     Patient's presentation physical exam findings are consistent with acute bacterial sinusitis.  We will treat with Augmentin.  Symptoms have been going on for 7 days and worsening over the past 4.  Given severity of symptoms this medication is indicated.  Vitals all within normal limits.  May continue Mucinex and Flonase.  ED/return precautions were given.  Patient is agreeable to the plan.         Differential diagnosis, natural history, supportive care, and  indications for immediate follow-up discussed.    Advised the patient to follow-up with the primary care physician for recheck, reevaluation, and consideration of further management.    Please note that this dictation was created using voice recognition software. I have made a reasonable attempt to correct obvious errors, but I expect that there are errors of grammar and possibly content that I did not discover before finalizing the note.    Electronically signed by Devan Arora PA-C.

## 2023-02-16 RX ORDER — METOPROLOL SUCCINATE 25 MG/1
50 TABLET, EXTENDED RELEASE ORAL DAILY
Qty: 180 TABLET | Refills: 0 | Status: SHIPPED | OUTPATIENT
Start: 2023-02-16 | End: 2023-05-12 | Stop reason: SDUPTHER

## 2023-04-13 ENCOUNTER — DOCUMENTATION (OUTPATIENT)
Dept: MEDICAL GROUP | Facility: PHYSICIAN GROUP | Age: 43
End: 2023-04-13
Payer: COMMERCIAL

## 2023-04-13 ENCOUNTER — PATIENT MESSAGE (OUTPATIENT)
Dept: MEDICAL GROUP | Facility: PHYSICIAN GROUP | Age: 43
End: 2023-04-13
Payer: COMMERCIAL

## 2023-04-13 DIAGNOSIS — M25.50 ARTHRALGIA, UNSPECIFIED JOINT: ICD-10-CM

## 2023-04-13 DIAGNOSIS — M25.60 JOINT STIFFNESS: ICD-10-CM

## 2023-04-13 NOTE — TELEPHONE ENCOUNTER
----- Message from Da Hutton Ass't sent at 4/13/2023  1:05 PM PDT -----  Regarding: neftali is requesting labs for authoritis  [11:28 AM] [facility]   So Ramesh sent me a message regarding your patients I wanted to see if we can take care of this today Per Mrs Chandler's message wants this done today.           MRN: [id number]    PT IS REQUESTING LAB ORDERS FOR BLOOD WORK RELATED TO HIS ARTHRITIS. PT CALLED 3 TIMES LAST WEEK AND LEFT VOICEMAILS. I CONTACTED OFFICE AND NO VM ON MA LINE . NO NEW LAB ORDERS ON PTS CHART. PT ALREADY HAS AN APPT ON 05/02/23. ADVISED PT THERE IS A WAY TO MESSAGE THROUGH [employer] AS WELL. NO RESPONSE FROM THE OFFICE YET. IS THERE ANYTHING WE CAN DO FOR THE PT?         THANK YOU.

## 2023-04-13 NOTE — TELEPHONE ENCOUNTER
----- Message from Da Hutton Ass't sent at 4/13/2023  1:05 PM PDT -----  Regarding: neftali is requesting labs for authoritis  [11:28 AM] [facility]   So Ramesh sent me a message regarding your patients I wanted to see if we can take care of this today Per Mrs Chandler's message wants this done today.           MRN: [id number]    PT IS REQUESTING LAB ORDERS FOR BLOOD WORK RELATED TO HIS ARTHRITIS. PT CALLED 3 TIMES LAST WEEK AND LEFT VOICEMAILS. I CONTACTED OFFICE AND NO VM ON MA LINE . NO NEW LAB ORDERS ON PTS CHART. PT ALREADY HAS AN APPT ON 05/02/23. ADVISED PT THERE IS A WAY TO MESSAGE THROUGH [employer] AS WELL. NO RESPONSE FROM THE OFFICE YET. IS THERE ANYTHING WE CAN DO FOR THE PT?         THANK YOU.

## 2023-04-13 NOTE — TELEPHONE ENCOUNTER
----- Message from Da Hutton Ass't sent at 4/13/2023  1:05 PM PDT -----  Regarding: neftali is requesting labs for authoritis  [11:28 AM] [facility]   So Ramesh sent me a message regarding your patients I wanted to see if we can take care of this today Per Mrs Chandler's message wants this done today.           MRN: [id number]    PT IS REQUESTING LAB ORDERS FOR BLOOD WORK RELATED TO HIS ARTHRITIS. PT CALLED 3 TIMES LAST WEEK AND LEFT VOICEMAILS. I CONTACTED OFFICE AND NO VM ON MA LINE . NO NEW LAB ORDERS ON PTS CHART. PT ALREADY HAS AN APPT ON 05/02/23. ADVISED PT THERE IS A WAY TO MESSAGE THROUGH [employer] AS WELL. NO RESPONSE FROM THE OFFICE YET. IS THERE ANYTHING WE CAN DO FOR THE PT?         THANK YOU.

## 2023-04-13 NOTE — PROGRESS NOTES
Mrs Chandler sent a teams message to me to f/up with this patients request for labs for author  I did sent an urgent message to Mrs Pimentel and Jess Pimentel's Ma via Epic as urgent  and I also advised Jess verbally if she could possibly let Mrs Merchantlynn know I sent a message back to Mrs Chandler health  that request has been addressed pending providers approval to order labs for patient.

## 2023-04-13 NOTE — PATIENT COMMUNICATION
Phone Number Called: [phone] (home)      Call outcome: Spoke to patient regarding message below. Patient aware the message for labs has been forwarded to provider. Patient aware to give the provider some time to process this request.     Message: Patient aware the message for labs has been forwarded to provider. Patient aware to give the provider some time to process this request.

## 2023-04-19 ENCOUNTER — HOSPITAL ENCOUNTER (OUTPATIENT)
Dept: LAB | Facility: MEDICAL CENTER | Age: 43
End: 2023-04-19
Payer: COMMERCIAL

## 2023-04-19 DIAGNOSIS — M25.60 JOINT STIFFNESS: ICD-10-CM

## 2023-04-19 DIAGNOSIS — M25.50 ARTHRALGIA, UNSPECIFIED JOINT: ICD-10-CM

## 2023-04-19 PROCEDURE — 86431 RHEUMATOID FACTOR QUANT: CPT

## 2023-04-19 PROCEDURE — 86200 CCP ANTIBODY: CPT

## 2023-04-19 PROCEDURE — 36415 COLL VENOUS BLD VENIPUNCTURE: CPT

## 2023-04-19 PROCEDURE — 86038 ANTINUCLEAR ANTIBODIES: CPT

## 2023-04-20 LAB — RHEUMATOID FACT SER IA-ACNC: 11 IU/ML (ref 0–14)

## 2023-04-21 LAB
CCP IGG SERPL-ACNC: 3 UNITS (ref 0–19)
NUCLEAR IGG SER QL IA: NORMAL

## 2023-04-30 SDOH — HEALTH STABILITY: PHYSICAL HEALTH: ON AVERAGE, HOW MANY MINUTES DO YOU ENGAGE IN EXERCISE AT THIS LEVEL?: 60 MIN

## 2023-04-30 SDOH — ECONOMIC STABILITY: INCOME INSECURITY: HOW HARD IS IT FOR YOU TO PAY FOR THE VERY BASICS LIKE FOOD, HOUSING, MEDICAL CARE, AND HEATING?: PATIENT DECLINED

## 2023-04-30 SDOH — ECONOMIC STABILITY: INCOME INSECURITY: IN THE LAST 12 MONTHS, WAS THERE A TIME WHEN YOU WERE NOT ABLE TO PAY THE MORTGAGE OR RENT ON TIME?: PATIENT REFUSED

## 2023-04-30 SDOH — ECONOMIC STABILITY: FOOD INSECURITY: WITHIN THE PAST 12 MONTHS, THE FOOD YOU BOUGHT JUST DIDN'T LAST AND YOU DIDN'T HAVE MONEY TO GET MORE.: PATIENT DECLINED

## 2023-04-30 SDOH — ECONOMIC STABILITY: TRANSPORTATION INSECURITY
IN THE PAST 12 MONTHS, HAS THE LACK OF TRANSPORTATION KEPT YOU FROM MEDICAL APPOINTMENTS OR FROM GETTING MEDICATIONS?: PATIENT DECLINED

## 2023-04-30 SDOH — HEALTH STABILITY: PHYSICAL HEALTH: ON AVERAGE, HOW MANY DAYS PER WEEK DO YOU ENGAGE IN MODERATE TO STRENUOUS EXERCISE (LIKE A BRISK WALK)?: 5 DAYS

## 2023-04-30 SDOH — ECONOMIC STABILITY: HOUSING INSECURITY
IN THE LAST 12 MONTHS, WAS THERE A TIME WHEN YOU DID NOT HAVE A STEADY PLACE TO SLEEP OR SLEPT IN A SHELTER (INCLUDING NOW)?: PATIENT REFUSED

## 2023-04-30 SDOH — ECONOMIC STABILITY: FOOD INSECURITY: WITHIN THE PAST 12 MONTHS, YOU WORRIED THAT YOUR FOOD WOULD RUN OUT BEFORE YOU GOT MONEY TO BUY MORE.: PATIENT DECLINED

## 2023-04-30 ASSESSMENT — SOCIAL DETERMINANTS OF HEALTH (SDOH)
HOW OFTEN DO YOU GET TOGETHER WITH FRIENDS OR RELATIVES?: PATIENT DECLINED
HOW OFTEN DO YOU ATTENT MEETINGS OF THE CLUB OR ORGANIZATION YOU BELONG TO?: PATIENT DECLINED
ARE YOU MARRIED, WIDOWED, DIVORCED, SEPARATED, NEVER MARRIED, OR LIVING WITH A PARTNER?: PATIENT DECLINED
HOW OFTEN DO YOU ATTEND CHURCH OR RELIGIOUS SERVICES?: MORE THAN 4 TIMES PER YEAR
DO YOU BELONG TO ANY CLUBS OR ORGANIZATIONS SUCH AS CHURCH GROUPS UNIONS, FRATERNAL OR ATHLETIC GROUPS, OR SCHOOL GROUPS?: YES
IN A TYPICAL WEEK, HOW MANY TIMES DO YOU TALK ON THE PHONE WITH FAMILY, FRIENDS, OR NEIGHBORS?: PATIENT DECLINED

## 2023-04-30 ASSESSMENT — LIFESTYLE VARIABLES
AUDIT-C TOTAL SCORE: -1
SKIP TO QUESTIONS 9-10: 0
HOW OFTEN DO YOU HAVE SIX OR MORE DRINKS ON ONE OCCASION: PATIENT DECLINED
HOW OFTEN DO YOU HAVE A DRINK CONTAINING ALCOHOL: PATIENT DECLINED
HOW MANY STANDARD DRINKS CONTAINING ALCOHOL DO YOU HAVE ON A TYPICAL DAY: PATIENT DECLINED

## 2023-05-08 SDOH — ECONOMIC STABILITY: FOOD INSECURITY: WITHIN THE PAST 12 MONTHS, YOU WORRIED THAT YOUR FOOD WOULD RUN OUT BEFORE YOU GOT MONEY TO BUY MORE.: PATIENT DECLINED

## 2023-05-08 SDOH — ECONOMIC STABILITY: TRANSPORTATION INSECURITY

## 2023-05-08 SDOH — HEALTH STABILITY: PHYSICAL HEALTH: ON AVERAGE, HOW MANY MINUTES DO YOU ENGAGE IN EXERCISE AT THIS LEVEL?: 60 MIN

## 2023-05-08 SDOH — HEALTH STABILITY: MENTAL HEALTH
STRESS IS WHEN SOMEONE FEELS TENSE, NERVOUS, ANXIOUS, OR CAN'T SLEEP AT NIGHT BECAUSE THEIR MIND IS TROUBLED. HOW STRESSED ARE YOU?: PATIENT DECLINED

## 2023-05-08 SDOH — HEALTH STABILITY: PHYSICAL HEALTH: ON AVERAGE, HOW MANY DAYS PER WEEK DO YOU ENGAGE IN MODERATE TO STRENUOUS EXERCISE (LIKE A BRISK WALK)?: 5 DAYS

## 2023-05-08 SDOH — ECONOMIC STABILITY: HOUSING INSECURITY

## 2023-05-08 SDOH — ECONOMIC STABILITY: INCOME INSECURITY: IN THE LAST 12 MONTHS, WAS THERE A TIME WHEN YOU WERE NOT ABLE TO PAY THE MORTGAGE OR RENT ON TIME?: PATIENT REFUSED

## 2023-05-08 SDOH — ECONOMIC STABILITY: FOOD INSECURITY: WITHIN THE PAST 12 MONTHS, THE FOOD YOU BOUGHT JUST DIDN'T LAST AND YOU DIDN'T HAVE MONEY TO GET MORE.: PATIENT DECLINED

## 2023-05-08 SDOH — ECONOMIC STABILITY: INCOME INSECURITY: HOW HARD IS IT FOR YOU TO PAY FOR THE VERY BASICS LIKE FOOD, HOUSING, MEDICAL CARE, AND HEATING?: PATIENT DECLINED

## 2023-05-08 ASSESSMENT — LIFESTYLE VARIABLES
HOW OFTEN DO YOU HAVE A DRINK CONTAINING ALCOHOL: PATIENT DECLINED
HOW OFTEN DO YOU HAVE SIX OR MORE DRINKS ON ONE OCCASION: PATIENT DECLINED
SKIP TO QUESTIONS 9-10: 0
HOW MANY STANDARD DRINKS CONTAINING ALCOHOL DO YOU HAVE ON A TYPICAL DAY: PATIENT DECLINED
AUDIT-C TOTAL SCORE: -1

## 2023-05-08 ASSESSMENT — SOCIAL DETERMINANTS OF HEALTH (SDOH)
IN A TYPICAL WEEK, HOW MANY TIMES DO YOU TALK ON THE PHONE WITH FAMILY, FRIENDS, OR NEIGHBORS?: PATIENT DECLINED
HOW OFTEN DO YOU ATTENT MEETINGS OF THE CLUB OR ORGANIZATION YOU BELONG TO?: PATIENT DECLINED
ARE YOU MARRIED, WIDOWED, DIVORCED, SEPARATED, NEVER MARRIED, OR LIVING WITH A PARTNER?: PATIENT DECLINED
HOW OFTEN DO YOU GET TOGETHER WITH FRIENDS OR RELATIVES?: PATIENT DECLINED
DO YOU BELONG TO ANY CLUBS OR ORGANIZATIONS SUCH AS CHURCH GROUPS UNIONS, FRATERNAL OR ATHLETIC GROUPS, OR SCHOOL GROUPS?: YES
HOW MANY DRINKS CONTAINING ALCOHOL DO YOU HAVE ON A TYPICAL DAY WHEN YOU ARE DRINKING: PATIENT DECLINED
HOW OFTEN DO YOU HAVE SIX OR MORE DRINKS ON ONE OCCASION: PATIENT DECLINED
HOW OFTEN DO YOU GET TOGETHER WITH FRIENDS OR RELATIVES?: PATIENT DECLINED
WITHIN THE PAST 12 MONTHS, YOU WORRIED THAT YOUR FOOD WOULD RUN OUT BEFORE YOU GOT THE MONEY TO BUY MORE: PATIENT DECLINED
DO YOU BELONG TO ANY CLUBS OR ORGANIZATIONS SUCH AS CHURCH GROUPS UNIONS, FRATERNAL OR ATHLETIC GROUPS, OR SCHOOL GROUPS?: YES
HOW OFTEN DO YOU ATTEND CHURCH OR RELIGIOUS SERVICES?: MORE THAN 4 TIMES PER YEAR
HOW OFTEN DO YOU ATTENT MEETINGS OF THE CLUB OR ORGANIZATION YOU BELONG TO?: PATIENT DECLINED
ARE YOU MARRIED, WIDOWED, DIVORCED, SEPARATED, NEVER MARRIED, OR LIVING WITH A PARTNER?: PATIENT DECLINED
HOW OFTEN DO YOU HAVE A DRINK CONTAINING ALCOHOL: PATIENT DECLINED
IN A TYPICAL WEEK, HOW MANY TIMES DO YOU TALK ON THE PHONE WITH FAMILY, FRIENDS, OR NEIGHBORS?: PATIENT DECLINED
HOW HARD IS IT FOR YOU TO PAY FOR THE VERY BASICS LIKE FOOD, HOUSING, MEDICAL CARE, AND HEATING?: PATIENT DECLINED
HOW OFTEN DO YOU ATTEND CHURCH OR RELIGIOUS SERVICES?: MORE THAN 4 TIMES PER YEAR

## 2023-05-10 ENCOUNTER — APPOINTMENT (OUTPATIENT)
Dept: MEDICAL GROUP | Facility: PHYSICIAN GROUP | Age: 43
End: 2023-05-10
Payer: COMMERCIAL

## 2023-05-12 DIAGNOSIS — I10 HYPERTENSION, UNSPECIFIED TYPE: ICD-10-CM

## 2023-05-15 RX ORDER — METOPROLOL SUCCINATE 25 MG/1
50 TABLET, EXTENDED RELEASE ORAL DAILY
Qty: 180 TABLET | Refills: 3 | Status: SHIPPED | OUTPATIENT
Start: 2023-05-15 | End: 2023-06-23

## 2023-06-23 ENCOUNTER — OFFICE VISIT (OUTPATIENT)
Dept: MEDICAL GROUP | Facility: PHYSICIAN GROUP | Age: 43
End: 2023-06-23
Payer: COMMERCIAL

## 2023-06-23 VITALS
HEIGHT: 67 IN | HEART RATE: 95 BPM | DIASTOLIC BLOOD PRESSURE: 92 MMHG | OXYGEN SATURATION: 100 % | WEIGHT: 191 LBS | SYSTOLIC BLOOD PRESSURE: 140 MMHG | TEMPERATURE: 98.8 F | BODY MASS INDEX: 29.98 KG/M2

## 2023-06-23 DIAGNOSIS — R00.0 TACHYCARDIA: ICD-10-CM

## 2023-06-23 DIAGNOSIS — Z23 NEED FOR VACCINATION: ICD-10-CM

## 2023-06-23 DIAGNOSIS — Z00.00 WELLNESS EXAMINATION: ICD-10-CM

## 2023-06-23 DIAGNOSIS — I10 HYPERTENSION, UNSPECIFIED TYPE: ICD-10-CM

## 2023-06-23 DIAGNOSIS — M10.9 GOUT OF BIG TOE: ICD-10-CM

## 2023-06-23 DIAGNOSIS — E66.9 OBESITY (BMI 35.0-39.9 WITHOUT COMORBIDITY): ICD-10-CM

## 2023-06-23 PROCEDURE — 3080F DIAST BP >= 90 MM HG: CPT

## 2023-06-23 PROCEDURE — 99396 PREV VISIT EST AGE 40-64: CPT

## 2023-06-23 PROCEDURE — 3077F SYST BP >= 140 MM HG: CPT

## 2023-06-23 RX ORDER — METOPROLOL TARTRATE 50 MG/1
50 TABLET, FILM COATED ORAL 2 TIMES DAILY
Qty: 180 TABLET | Refills: 3 | Status: SHIPPED | OUTPATIENT
Start: 2023-06-23 | End: 2023-08-17 | Stop reason: SDUPTHER

## 2023-06-23 RX ORDER — INDOMETHACIN 50 MG/1
50 CAPSULE ORAL 3 TIMES DAILY
Qty: 90 CAPSULE | Refills: 0 | Status: SHIPPED | OUTPATIENT
Start: 2023-06-23

## 2023-06-23 ASSESSMENT — FIBROSIS 4 INDEX: FIB4 SCORE: 0.78

## 2023-06-23 ASSESSMENT — PATIENT HEALTH QUESTIONNAIRE - PHQ9: CLINICAL INTERPRETATION OF PHQ2 SCORE: 0

## 2023-06-23 NOTE — PROGRESS NOTES
"Subjective:     CC:   Chief Complaint   Patient presents with    Annual Exam       HISTORY OF THE PRESENT ILLNESS: Devan is a pleasant 42 y.o. male here today for an annual exam.  He does have the following concerns:    Problem   Tachycardia    Patient has a family history of tachycardia.  He was first started on metoprolol 50 mg after concerns of his heart \"racing\" in the low 100s.  He is concerned today about high heart rate.  He has documented heart rates on his watch that go to 160s when he is active, but on average will stay around 70-80 when he is at rest.     Gout of Big Toe    Patient believes he had a gout flare in his left great toe.  He has never had gout before, however, and is something his father suffers from.  He is inquiring about medication for this.     High Blood Pressure    Patient is here to follow-up on his blood pressure.  He monitors it at home and it has been normal.  He is managed on amlodipine, olmesartan and metoprolol.           Health Maintenance: Completed    Anticipatory Guidance  Seat belts, bike helmet, gun safety discussed.  Sun protection used.    Cancer Screening:  Colorectal cancer screening: n/a    Infectious Disease Screening/Immunizations:  Immunizations: Up to date on vaccines    ROS:  All systems negative expect as addressed in assessment and plan.     Objective:     Exam:  BP (!) 140/92 (BP Location: Left arm, Patient Position: Sitting, BP Cuff Size: Adult)   Pulse 95   Temp 37.1 °C (98.8 °F) (Temporal)   Ht 1.702 m (5' 7\")   Wt 86.6 kg (191 lb)   SpO2 100%   BMI 29.91 kg/m²  Body mass index is 29.91 kg/m².    Physical Exam  Constitutional:       Appearance: Normal appearance.   HENT:      Right Ear: Tympanic membrane normal.      Left Ear: Tympanic membrane normal.   Cardiovascular:      Rate and Rhythm: Normal rate.   Pulmonary:      Effort: Pulmonary effort is normal.      Breath sounds: Normal breath sounds.   Abdominal:      General: Bowel sounds are normal.     "  Palpations: Abdomen is soft.   Musculoskeletal:         General: Normal range of motion.   Neurological:      Mental Status: He is alert. Mental status is at baseline.   Psychiatric:         Mood and Affect: Mood normal.         Behavior: Behavior normal.       Labs: Ordered    Assessment & Plan:     42 y.o. male with the following -    1. Wellness examination  Health conditions and medications reviewed and updated. All screenings discussed and up-to-date. Health maintenance completed.     - TSH WITH REFLEX TO FT4; Future  - VITAMIN D,25 HYDROXY (DEFICIENCY); Future  - Lipid Profile; Future  - HEMOGLOBIN A1C; Future  - Comp Metabolic Panel; Future  - CBC WITH DIFFERENTIAL; Future    2. Need for vaccination  - HEP C VIRUS ANTIBODY; Future    3. Obesity (BMI 35.0-39.9 without comorbidity) (HCC)  - Lipid Profile; Future  - HEMOGLOBIN A1C; Future    4. Tachycardia  Chronic, ongoing  Will increase metoprolol to 50 mg twice daily.  - metoprolol tartrate (LOPRESSOR) 50 MG Tab; Take 1 Tablet by mouth 2 times a day.  Dispense: 180 Tablet; Refill: 3    5. Hypertension, unspecified type  Chronic, controlled  Continue with amlodipine, olmesartan and metoprolol.  - metoprolol tartrate (LOPRESSOR) 50 MG Tab; Take 1 Tablet by mouth 2 times a day.  Dispense: 180 Tablet; Refill: 3    6. Gout of big toe  This is a new problem.  I will check uric acid levels and provide a prescription for indomethacin to use as needed for his next flare.  If he does have an additional flare, we will consider allopurinol for prevention.  - URIC ACID; Future  - indomethacin (INDOCIN) 50 MG Cap; Take 1 Capsule by mouth 3 times a day.  Dispense: 90 Capsule; Refill: 0    Patient was educated in proper administration of medication(s) ordered today including safety, possible SE, risks, benefits, rationale and alternatives to therapy.   Supportive care, differential diagnoses, and indications for immediate follow-up discussed with patient.    Pathogenesis of  diagnosis discussed including typical length and natural progression.    Instructed to return to clinic or nearest emergency department for any change in condition, further concerns, or worsening of symptoms.  Patient states understanding of the plan of care and discharge instructions.    Return in about 6 months (around 12/23/2023) for Follow-up.    I have placed the above orders and discussed them with an approved delegating provider.  The MA is performing the below orders under the direction of Dr. Jara.    Please note that this dictation was created using voice recognition software. I have worked with consultants from the vendor as well as technical experts from [facility] to optimize the interface. I have made every reasonable attempt to correct obvious errors, but I expect that there are errors of grammar and possibly content that I did not discover before finalizing the note.

## 2023-06-23 NOTE — ASSESSMENT & PLAN NOTE
This is a new problem.  I will check uric acid levels and provide a prescription for indomethacin to use as needed for his next flare.  If he does have an additional flare, we will consider allopurinol for prevention.

## 2023-07-28 DIAGNOSIS — B00.9 HSV-2 INFECTION: ICD-10-CM

## 2023-07-31 RX ORDER — VALACYCLOVIR HYDROCHLORIDE 500 MG/1
TABLET, FILM COATED ORAL
Qty: 90 TABLET | Refills: 0 | Status: SHIPPED | OUTPATIENT
Start: 2023-07-31 | End: 2023-08-17 | Stop reason: SDUPTHER

## 2023-08-17 DIAGNOSIS — I10 HYPERTENSION, UNSPECIFIED TYPE: ICD-10-CM

## 2023-08-17 DIAGNOSIS — R00.0 TACHYCARDIA: ICD-10-CM

## 2023-08-17 DIAGNOSIS — B00.9 HSV-2 INFECTION: ICD-10-CM

## 2023-08-21 RX ORDER — VALACYCLOVIR HYDROCHLORIDE 500 MG/1
TABLET, FILM COATED ORAL
Qty: 90 TABLET | Refills: 0 | Status: SHIPPED | OUTPATIENT
Start: 2023-08-21 | End: 2023-09-07

## 2023-08-21 RX ORDER — METOPROLOL TARTRATE 50 MG/1
50 TABLET, FILM COATED ORAL 2 TIMES DAILY
Qty: 180 TABLET | Refills: 3 | Status: SHIPPED | OUTPATIENT
Start: 2023-08-21 | End: 2023-09-13 | Stop reason: SDUPTHER

## 2023-09-06 DIAGNOSIS — B00.9 HSV-2 INFECTION: ICD-10-CM

## 2023-09-07 RX ORDER — VALACYCLOVIR HYDROCHLORIDE 500 MG/1
TABLET, FILM COATED ORAL
Qty: 90 TABLET | Refills: 0 | Status: SHIPPED | OUTPATIENT
Start: 2023-09-07

## 2023-09-13 ENCOUNTER — DOCUMENTATION (OUTPATIENT)
Dept: HEALTH INFORMATION MANAGEMENT | Facility: OTHER | Age: 43
End: 2023-09-13
Payer: COMMERCIAL

## 2023-09-13 DIAGNOSIS — R00.0 TACHYCARDIA: ICD-10-CM

## 2023-09-13 DIAGNOSIS — I10 HYPERTENSION, UNSPECIFIED TYPE: ICD-10-CM

## 2023-09-17 RX ORDER — METOPROLOL TARTRATE 50 MG/1
50 TABLET, FILM COATED ORAL 2 TIMES DAILY
Qty: 180 TABLET | Refills: 3 | Status: SHIPPED | OUTPATIENT
Start: 2023-09-17

## 2024-01-04 ENCOUNTER — APPOINTMENT (OUTPATIENT)
Dept: MEDICAL GROUP | Facility: PHYSICIAN GROUP | Age: 44
End: 2024-01-04
Payer: COMMERCIAL

## 2024-02-02 ENCOUNTER — TELEPHONE (OUTPATIENT)
Dept: HEALTH INFORMATION MANAGEMENT | Facility: OTHER | Age: 44
End: 2024-02-02
Payer: COMMERCIAL

## 2024-03-01 ENCOUNTER — OFFICE VISIT (OUTPATIENT)
Dept: URGENT CARE | Facility: PHYSICIAN GROUP | Age: 44
End: 2024-03-01
Payer: COMMERCIAL

## 2024-03-01 VITALS
OXYGEN SATURATION: 98 % | BODY MASS INDEX: 33.13 KG/M2 | HEART RATE: 73 BPM | HEIGHT: 67 IN | WEIGHT: 211.08 LBS | SYSTOLIC BLOOD PRESSURE: 138 MMHG | RESPIRATION RATE: 16 BRPM | DIASTOLIC BLOOD PRESSURE: 92 MMHG | TEMPERATURE: 97.5 F

## 2024-03-01 DIAGNOSIS — J02.9 VIRAL PHARYNGITIS: ICD-10-CM

## 2024-03-01 LAB — S PYO DNA SPEC NAA+PROBE: NOT DETECTED

## 2024-03-01 PROCEDURE — 1125F AMNT PAIN NOTED PAIN PRSNT: CPT | Performed by: STUDENT IN AN ORGANIZED HEALTH CARE EDUCATION/TRAINING PROGRAM

## 2024-03-01 PROCEDURE — 87651 STREP A DNA AMP PROBE: CPT | Performed by: STUDENT IN AN ORGANIZED HEALTH CARE EDUCATION/TRAINING PROGRAM

## 2024-03-01 PROCEDURE — 3075F SYST BP GE 130 - 139MM HG: CPT | Performed by: STUDENT IN AN ORGANIZED HEALTH CARE EDUCATION/TRAINING PROGRAM

## 2024-03-01 PROCEDURE — 3080F DIAST BP >= 90 MM HG: CPT | Performed by: STUDENT IN AN ORGANIZED HEALTH CARE EDUCATION/TRAINING PROGRAM

## 2024-03-01 PROCEDURE — 99213 OFFICE O/P EST LOW 20 MIN: CPT | Performed by: STUDENT IN AN ORGANIZED HEALTH CARE EDUCATION/TRAINING PROGRAM

## 2024-03-01 ASSESSMENT — PAIN SCALES - GENERAL: PAINLEVEL: 5=MODERATE PAIN

## 2024-03-01 ASSESSMENT — FIBROSIS 4 INDEX: FIB4 SCORE: 0.8

## 2024-03-01 NOTE — PROGRESS NOTES
Subjective:   CHIEF COMPLAINT  Chief Complaint   Patient presents with    Pharyngitis     X 2 days     Headache    Nasal Congestion       HPI  Devan Vann is a 43 y.o. male who presents with a chief complaint of a sore throat x 2 days.  He has tried ibuprofen and DayQuil with limited relief of symptoms.  Positive ROS for dry cough and congestion.  No wheezing or shortness of breath.  No fevers.  No known sick contacts.  Has had strep pharyngitis in the past.    REVIEW OF SYSTEMS  General: no fever or chills  GI: no nausea or vomiting  See HPI for further details.    PAST MEDICAL HISTORY  Patient Active Problem List    Diagnosis Date Noted    Tachycardia 06/23/2023    Gout of big toe 06/23/2023    Other chest pain 09/19/2022    High blood pressure 08/05/2022    Insomnia 06/23/2022    HSV-2 infection 12/21/2018    Obesity (BMI 35.0-39.9 without comorbidity) ([facility]) 01/30/2018       SURGICAL HISTORY   has a past surgical history that includes other orthopedic surgery and neuroplasty & or transpos median nrv carpal rachid (Left, 6/2/2020).    ALLERGIES  No Known Allergies    CURRENT MEDICATIONS  Home Medications    **Home medications have not yet been reviewed for this encounter**         SOCIAL HISTORY  Social History     Tobacco Use    Smoking status: Never    Smokeless tobacco: Current     Types: Chew    Tobacco comments:     Nicotine pouches 5-6/day   Vaping Use    Vaping Use: Never used   Substance and Sexual Activity    Alcohol use: Yes     Alcohol/week: 1.8 oz     Types: 3 Glasses of wine per week     Comment: 2-3 drinks a night    Drug use: No    Sexual activity: Not on file       FAMILY HISTORY  Family History   Problem Relation Age of Onset    No Known Problems Mother     Heart Disease Father         MI age mid-50's    Heart Disease Paternal Grandfather           Objective:   PHYSICAL EXAM  VITAL SIGNS: BP (!) 138/92 (BP Location: Right arm, Patient Position: Sitting, BP Cuff Size: Adult)   Pulse 73    "Temp 36.4 °C (97.5 °F) (Temporal)   Resp 16   Ht 1.702 m (5' 7\")   Wt 95.7 kg (211 lb 1.3 oz)   SpO2 98%   BMI 33.06 kg/m²     Gen: no acute distress, normal voice  Skin: dry, intact, moist mucosal membranes  Eyes: No conjunctival injection b/l  Neck: Normal range of motion. No meningeal signs.   ENT: Right tonsillar exudate vs tonsil stone without any erythema.  Uvula midline. No lymphadenopathy.  Lungs: No increased work of breathing.  CTAB w/ symmetric expansion  CV: RRR w/o murmurs or clicks  Psych: normal affect, normal judgement, alert, awake    Assessment/Plan:     1. Viral pharyngitis  POCT CEPHEID GROUP A STREP - PCR      Signs and symptoms are consistent with a viral respiratory infection and should be self-limiting.  Recommended symptomatic treatment including Motrin, Tylenol, relative rest and fluid hydration. Return to urgent care any new/worsening symptoms or further questions or concerns.  Patient understood everything discussed.  All questions were answered.    Differential diagnosis and supportive care discussed. Follow-up as needed if symptoms worsen or fail to improve to PCP, Urgent care or Emergency Room.    Please note that this dictation was created using voice recognition software. I have made a reasonable attempt to correct obvious errors, but I expect that there are errors of grammar and possibly content that I did not discover before finalizing the note.         "

## 2024-03-01 NOTE — LETTER
March 1, 2024       Patient: Devan Vann   YOB: 1980   Date of Visit: 3/1/2024         To Whom It May Concern:    Devan Vann was seen in urgent care and is excuse from work today.    If you have any questions or concerns, please don't hesitate to call [phone]          Sincerely,          Ezio Dean D.O.  Electronically Signed

## 2024-12-20 DIAGNOSIS — I10 HYPERTENSION, UNSPECIFIED TYPE: ICD-10-CM

## 2024-12-20 DIAGNOSIS — R00.0 TACHYCARDIA: ICD-10-CM

## 2024-12-23 RX ORDER — METOPROLOL TARTRATE 50 MG
50 TABLET ORAL 2 TIMES DAILY
Qty: 60 TABLET | Refills: 0 | Status: SHIPPED | OUTPATIENT
Start: 2024-12-23

## 2025-01-15 DIAGNOSIS — I10 HYPERTENSION, UNSPECIFIED TYPE: ICD-10-CM

## 2025-01-15 DIAGNOSIS — R00.0 TACHYCARDIA: ICD-10-CM

## 2025-01-15 RX ORDER — METOPROLOL TARTRATE 50 MG
50 TABLET ORAL 2 TIMES DAILY
Qty: 60 TABLET | Refills: 0 | Status: SHIPPED | OUTPATIENT
Start: 2025-01-15 | End: 2025-01-22 | Stop reason: SDUPTHER

## 2025-01-15 NOTE — TELEPHONE ENCOUNTER
To: AREN    Okay to refill until follow up? Last seen in 2022. Please advise. Thank you    To: Scheduling    Please call to schedule follow up appointment. Thank you!

## 2025-01-22 ENCOUNTER — OFFICE VISIT (OUTPATIENT)
Dept: CARDIOLOGY | Facility: MEDICAL CENTER | Age: 45
End: 2025-01-22
Attending: PHYSICIAN ASSISTANT
Payer: COMMERCIAL

## 2025-01-22 VITALS
SYSTOLIC BLOOD PRESSURE: 144 MMHG | BODY MASS INDEX: 32.8 KG/M2 | WEIGHT: 209 LBS | HEART RATE: 82 BPM | DIASTOLIC BLOOD PRESSURE: 108 MMHG | OXYGEN SATURATION: 97 % | HEIGHT: 67 IN | RESPIRATION RATE: 16 BRPM

## 2025-01-22 DIAGNOSIS — I10 HYPERTENSION, UNSPECIFIED TYPE: ICD-10-CM

## 2025-01-22 DIAGNOSIS — I25.84 CORONARY ARTERY DISEASE DUE TO CALCIFIED CORONARY LESION: ICD-10-CM

## 2025-01-22 DIAGNOSIS — Z82.49 FAMILY HISTORY OF PREMATURE CORONARY ARTERY DISEASE: ICD-10-CM

## 2025-01-22 DIAGNOSIS — I25.10 CORONARY ARTERY DISEASE DUE TO CALCIFIED CORONARY LESION: ICD-10-CM

## 2025-01-22 DIAGNOSIS — E78.2 MIXED HYPERLIPIDEMIA: ICD-10-CM

## 2025-01-22 DIAGNOSIS — R00.0 TACHYCARDIA: ICD-10-CM

## 2025-01-22 PROCEDURE — 99212 OFFICE O/P EST SF 10 MIN: CPT | Performed by: PHYSICIAN ASSISTANT

## 2025-01-22 PROCEDURE — 3080F DIAST BP >= 90 MM HG: CPT | Performed by: PHYSICIAN ASSISTANT

## 2025-01-22 PROCEDURE — 99214 OFFICE O/P EST MOD 30 MIN: CPT | Performed by: PHYSICIAN ASSISTANT

## 2025-01-22 PROCEDURE — 3077F SYST BP >= 140 MM HG: CPT | Performed by: PHYSICIAN ASSISTANT

## 2025-01-22 RX ORDER — ROSUVASTATIN CALCIUM 10 MG/1
10 TABLET, COATED ORAL EVERY EVENING
Qty: 90 TABLET | Refills: 3 | Status: SHIPPED | OUTPATIENT
Start: 2025-01-22

## 2025-01-22 RX ORDER — METOPROLOL TARTRATE 50 MG
50 TABLET ORAL 2 TIMES DAILY
Qty: 90 TABLET | Refills: 3 | Status: SHIPPED | OUTPATIENT
Start: 2025-01-22

## 2025-01-22 ASSESSMENT — ENCOUNTER SYMPTOMS
SHORTNESS OF BREATH: 0
LOSS OF CONSCIOUSNESS: 0
DIZZINESS: 0
PALPITATIONS: 0

## 2025-01-22 NOTE — PROGRESS NOTES
Chief Complaint   Patient presents with    Tachycardia     Follow up          Subjective:   Devan Vann is a 44 y.o. male who presents today for over due follow-up. He was last seen in Sept 2022 by myself.     His pertinent medical history includes hypertension diagnosed around 32 years old, and consistently treated for this.  He has a coronary calcium score of 58 at the age of 42 years old with a family history of premature coronary artery disease.    Since our last visit Devan has discontinued his antihypertensives (olmesartan and amlodipine) as well as the rosuvastatin due to diarrhea.  He was not sure which was the culprit so he stopped all 3 and his symptoms have improved.  He continues on the metoprolol tartrate 50 mg twice a day for blood pressure control.  At home his blood pressure readings are generally 130 occasionally over 140 with diastolic readings in the 80s.    He has no cardiac complaints.    He estimates about 8-10 drinks per week.  He uses nicotine pouches continuously through the workday. He is an .    His father required triple bypass surgery at age 55, grandfather had sudden cardiac death at age 50.          Past Medical History:   Diagnosis Date    Herpes     Hypertension          Family History   Problem Relation Age of Onset    No Known Problems Mother     Heart Disease Father         MI age mid-50's    Heart Disease Paternal Grandfather          Social History     Tobacco Use    Smoking status: Never    Smokeless tobacco: Current     Types: Chew    Tobacco comments:     Nicotine pouches 5-6/day   Vaping Use    Vaping status: Never Used   Substance Use Topics    Alcohol use: Yes     Alcohol/week: 1.8 oz     Types: 3 Glasses of wine per week     Comment: 2-3 drinks a night    Drug use: No         No Known Allergies      Current Outpatient Medications   Medication Sig    rosuvastatin (CRESTOR) 10 MG Tab Take 1 Tablet by mouth every evening.    metoprolol tartrate (LOPRESSOR)  "50 MG Tab Take 1 Tablet by mouth 2 times a day.    valACYclovir (VALTREX) 500 MG Tab TAKE 1 TAB TWICE DAILY FOR 3 DAYS AS NEEDED FOR OUTBREAK.    traZODone (DESYREL) 50 MG Tab TAKE 1-2 TABLETS BY MOUTH EVERY EVENING.    Ascorbic Acid (VITAMIN C) 1000 MG Tab Take 1 Tab by mouth every day.    Omega-3 Fatty Acids (FISH OIL) 500 MG Cap Take 1 Cap by mouth every day.    aspirin (ASA) 81 MG Chew Tab chewable tablet Take 81 mg by mouth every day.    indomethacin (INDOCIN) 50 MG Cap Take 1 Capsule by mouth 3 times a day.         Review of Systems   Respiratory:  Negative for shortness of breath.    Cardiovascular:  Negative for chest pain and palpitations.   Neurological:  Negative for dizziness and loss of consciousness.          Objective:   BP (!) 144/108 (BP Location: Left arm, Patient Position: Sitting)   Pulse 82   Resp 16   Ht 1.702 m (5' 7\")   Wt 94.8 kg (209 lb)   SpO2 97%  Body mass index is 32.73 kg/m².         Physical Exam  Vitals reviewed.   HENT:      Head: Normocephalic and atraumatic.   Cardiovascular:      Rate and Rhythm: Normal rate and regular rhythm.      Heart sounds: No murmur heard.  Pulmonary:      Effort: Pulmonary effort is normal. No respiratory distress.      Breath sounds: No rales.   Musculoskeletal:      Right lower leg: No edema.      Left lower leg: No edema.   Skin:     General: Skin is warm.   Neurological:      General: No focal deficit present.      Mental Status: He is alert.      Gait: Gait normal.   Psychiatric:         Judgment: Judgment normal.            Assessment:     1. Mixed hyperlipidemia  rosuvastatin (CRESTOR) 10 MG Tab    Lipid Profile    Comp Metabolic Panel      2. Tachycardia  metoprolol tartrate (LOPRESSOR) 50 MG Tab      3. Hypertension, unspecified type  metoprolol tartrate (LOPRESSOR) 50 MG Tab    Comp Metabolic Panel           Medical Decision Making:  Today's Assessment / Plan:   Coronary arteriosclerosis  Dyslipidemia  Family history of premature coronary " artery disease  -Retrial of rosuvastatin 10 mg.  If this causes GI upset can change to pravastatin or attempt ezetimibe.  His ASCVD risk is high, LDL goal less than 55 and if he cannot tolerate statins he should be on a PCSK9 inhibitor or inclisiran  -Lipid profile in 3 months    Hypertension  -Readings controlled at home although elevated in the office today.  Discussed other optimal medications for blood pressure control.  Will add new medications 1 at a time.  Start the statin continue metoprolol tartrate for now, reassess in 3 months    Return in about 3 months (around 4/22/2025) for follow up with me.  Sooner if problems.

## 2025-01-22 NOTE — PATIENT INSTRUCTIONS
Start rosuvastatin for cholesterol and prevention of heart attack. If this medication doesn't work for you please let me know right away. If you tolerate it then get your fasting blood work in 3 months before you see me

## 2025-02-11 DIAGNOSIS — I10 HYPERTENSION, UNSPECIFIED TYPE: ICD-10-CM

## 2025-02-11 DIAGNOSIS — E78.2 MIXED HYPERLIPIDEMIA: ICD-10-CM

## 2025-02-11 DIAGNOSIS — R00.0 TACHYCARDIA: ICD-10-CM

## 2025-02-11 RX ORDER — METOPROLOL TARTRATE 50 MG
50 TABLET ORAL 2 TIMES DAILY
Qty: 180 TABLET | Refills: 3 | Status: SHIPPED | OUTPATIENT
Start: 2025-02-11

## 2025-02-11 RX ORDER — ROSUVASTATIN CALCIUM 10 MG/1
10 TABLET, COATED ORAL EVERY EVENING
Qty: 90 TABLET | Refills: 3 | Status: SHIPPED | OUTPATIENT
Start: 2025-02-11

## 2025-02-11 NOTE — TELEPHONE ENCOUNTER
Is the patient due for a refill? No pharmacy change    Was the patient seen the last 15 months? Yes    Date of last office visit: 1/22/25    Does the patient have an upcoming appointment?  Yes   If yes, When? 4/23/25    Provider to refill:AREN    Does the patient have retirement Plus and need 100-day supply? (This applies to ALL medications) Patient does not have SCP

## 2025-02-21 ENCOUNTER — HOSPITAL ENCOUNTER (OUTPATIENT)
Dept: LAB | Facility: MEDICAL CENTER | Age: 45
End: 2025-02-21
Attending: PHYSICIAN ASSISTANT
Payer: COMMERCIAL

## 2025-02-21 DIAGNOSIS — I10 HYPERTENSION, UNSPECIFIED TYPE: ICD-10-CM

## 2025-02-21 DIAGNOSIS — E78.2 MIXED HYPERLIPIDEMIA: ICD-10-CM

## 2025-02-21 DIAGNOSIS — I25.84 CORONARY ARTERY DISEASE DUE TO CALCIFIED CORONARY LESION: ICD-10-CM

## 2025-02-21 DIAGNOSIS — I25.10 CORONARY ARTERY DISEASE DUE TO CALCIFIED CORONARY LESION: ICD-10-CM

## 2025-02-21 DIAGNOSIS — Z82.49 FAMILY HISTORY OF PREMATURE CORONARY ARTERY DISEASE: ICD-10-CM

## 2025-02-21 PROCEDURE — 36415 COLL VENOUS BLD VENIPUNCTURE: CPT

## 2025-02-21 PROCEDURE — 80053 COMPREHEN METABOLIC PANEL: CPT

## 2025-02-21 PROCEDURE — 80061 LIPID PANEL: CPT

## 2025-02-21 PROCEDURE — 83695 ASSAY OF LIPOPROTEIN(A): CPT

## 2025-02-22 LAB
ALBUMIN SERPL BCP-MCNC: 4.5 G/DL (ref 3.2–4.9)
ALBUMIN/GLOB SERPL: 1.7 G/DL
ALP SERPL-CCNC: 54 U/L (ref 30–99)
ALT SERPL-CCNC: 51 U/L (ref 2–50)
ANION GAP SERPL CALC-SCNC: 12 MMOL/L (ref 7–16)
AST SERPL-CCNC: 31 U/L (ref 12–45)
BILIRUB SERPL-MCNC: 0.5 MG/DL (ref 0.1–1.5)
BUN SERPL-MCNC: 13 MG/DL (ref 8–22)
CALCIUM ALBUM COR SERPL-MCNC: 8.8 MG/DL (ref 8.5–10.5)
CALCIUM SERPL-MCNC: 9.2 MG/DL (ref 8.5–10.5)
CHLORIDE SERPL-SCNC: 103 MMOL/L (ref 96–112)
CHOLEST SERPL-MCNC: 173 MG/DL (ref 100–199)
CO2 SERPL-SCNC: 25 MMOL/L (ref 20–33)
CREAT SERPL-MCNC: 1.02 MG/DL (ref 0.5–1.4)
FASTING STATUS PATIENT QL REPORTED: NORMAL
GFR SERPLBLD CREATININE-BSD FMLA CKD-EPI: 94 ML/MIN/1.73 M 2
GLOBULIN SER CALC-MCNC: 2.7 G/DL (ref 1.9–3.5)
GLUCOSE SERPL-MCNC: 105 MG/DL (ref 65–99)
HDLC SERPL-MCNC: 49 MG/DL
LDLC SERPL CALC-MCNC: 85 MG/DL
POTASSIUM SERPL-SCNC: 4.6 MMOL/L (ref 3.6–5.5)
PROT SERPL-MCNC: 7.2 G/DL (ref 6–8.2)
SODIUM SERPL-SCNC: 140 MMOL/L (ref 135–145)
TRIGL SERPL-MCNC: 194 MG/DL (ref 0–149)

## 2025-02-23 LAB — LPA SERPL-MCNC: <6 MG/DL

## 2025-02-24 ENCOUNTER — RESULTS FOLLOW-UP (OUTPATIENT)
Dept: CARDIOLOGY | Facility: MEDICAL CENTER | Age: 45
End: 2025-02-24

## 2025-04-23 ENCOUNTER — OFFICE VISIT (OUTPATIENT)
Dept: CARDIOLOGY | Facility: MEDICAL CENTER | Age: 45
End: 2025-04-23
Attending: PHYSICIAN ASSISTANT
Payer: COMMERCIAL

## 2025-04-23 VITALS
WEIGHT: 209 LBS | HEIGHT: 67 IN | DIASTOLIC BLOOD PRESSURE: 74 MMHG | HEART RATE: 87 BPM | SYSTOLIC BLOOD PRESSURE: 122 MMHG | BODY MASS INDEX: 32.8 KG/M2 | OXYGEN SATURATION: 99 % | RESPIRATION RATE: 16 BRPM

## 2025-04-23 DIAGNOSIS — I25.10 CORONARY ARTERY DISEASE DUE TO CALCIFIED CORONARY LESION: ICD-10-CM

## 2025-04-23 DIAGNOSIS — I10 HYPERTENSION, UNSPECIFIED TYPE: ICD-10-CM

## 2025-04-23 DIAGNOSIS — Z82.49 FAMILY HISTORY OF PREMATURE CORONARY ARTERY DISEASE: ICD-10-CM

## 2025-04-23 DIAGNOSIS — E78.2 MIXED HYPERLIPIDEMIA: ICD-10-CM

## 2025-04-23 DIAGNOSIS — I25.84 CORONARY ARTERY DISEASE DUE TO CALCIFIED CORONARY LESION: ICD-10-CM

## 2025-04-23 DIAGNOSIS — F17.220 CHEWING TOBACCO NICOTINE DEPENDENCE WITHOUT COMPLICATION: ICD-10-CM

## 2025-04-23 PROCEDURE — 99213 OFFICE O/P EST LOW 20 MIN: CPT | Performed by: PHYSICIAN ASSISTANT

## 2025-04-23 PROCEDURE — 3074F SYST BP LT 130 MM HG: CPT | Performed by: PHYSICIAN ASSISTANT

## 2025-04-23 PROCEDURE — 3078F DIAST BP <80 MM HG: CPT | Performed by: PHYSICIAN ASSISTANT

## 2025-04-23 ASSESSMENT — ENCOUNTER SYMPTOMS
LOSS OF CONSCIOUSNESS: 0
DIARRHEA: 0
NAUSEA: 0
DIZZINESS: 0
PND: 0
PALPITATIONS: 0
COUGH: 0
ORTHOPNEA: 0
SHORTNESS OF BREATH: 0

## 2025-04-23 NOTE — PROGRESS NOTES
Chief Complaint   Patient presents with    Follow-Up     FV DX:Mixed hyperlipidemia          Subjective:   Devan Vann is a 44 y.o. male who presents today for over due follow-up. He was lost to follow up from 2022 to 2025. Returns for 3mo follow up today.     His pertinent medical history includes hypertension diagnosed around 32 years old, and consistently treated for this.  He has a coronary calcium score of 58 at the age of 42 years old with a family history of premature coronary artery disease.    At her last clinic visit Devan had discontinued the antihypertensives (olmesartan and amlodipine) as well as rosuvastatin due to diarrhea.  He had been on metoprolol 50 mg twice daily for blood pressure control.  We restarted moderate intensity rosuvastatin at 10 mg.  His repeat lipid profile shows an LDL of 85.  He had an undetectable lipoprotein a.    He is tolerating the rosuvastatin without any GI issues.  At home his blood pressure readings are generally less than 130.  He feels well on current medications.    He has no cardiac complaints.    He estimates about 8-10 drinks per week.  He uses nicotine pouches continuously through the workday. He is an .    His father required triple bypass surgery at age 55, grandfather had sudden cardiac death at age 50.      Past Medical History:   Diagnosis Date    Herpes     Hypertension          Family History   Problem Relation Age of Onset    No Known Problems Mother     Heart Disease Father         MI age mid-50's    Heart Disease Paternal Grandfather          Social History     Tobacco Use    Smoking status: Never    Smokeless tobacco: Current     Types: Chew    Tobacco comments:     Nicotine pouches 5-6/day   Vaping Use    Vaping status: Never Used   Substance Use Topics    Alcohol use: Yes     Alcohol/week: 1.8 oz     Types: 3 Glasses of wine per week     Comment: 2-3 drinks a night    Drug use: No         No Known Allergies      Current Outpatient  "Medications   Medication Sig    rosuvastatin (CRESTOR) 10 MG Tab Take 1 Tablet by mouth every evening.    metoprolol tartrate (LOPRESSOR) 50 MG Tab Take 1 Tablet by mouth 2 times a day.    valACYclovir (VALTREX) 500 MG Tab TAKE 1 TAB TWICE DAILY FOR 3 DAYS AS NEEDED FOR OUTBREAK.    traZODone (DESYREL) 50 MG Tab TAKE 1-2 TABLETS BY MOUTH EVERY EVENING.    Ascorbic Acid (VITAMIN C) 1000 MG Tab Take 1 Tab by mouth every day.    Omega-3 Fatty Acids (FISH OIL) 500 MG Cap Take 1 Cap by mouth every day.    aspirin (ASA) 81 MG Chew Tab chewable tablet Take 81 mg by mouth every day.         Review of Systems   Respiratory:  Negative for cough and shortness of breath.    Cardiovascular:  Negative for chest pain, palpitations, orthopnea, leg swelling and PND.   Gastrointestinal:  Negative for diarrhea and nausea.   Neurological:  Negative for dizziness and loss of consciousness.          Objective:   /74 (BP Location: Left arm, Patient Position: Sitting, BP Cuff Size: Adult long)   Pulse 87   Resp 16   Ht 1.702 m (5' 7\")   Wt 94.8 kg (209 lb)   SpO2 99%  Body mass index is 32.73 kg/m².         Physical Exam  Vitals reviewed.   HENT:      Head: Normocephalic and atraumatic.   Cardiovascular:      Rate and Rhythm: Normal rate and regular rhythm.      Heart sounds: No murmur heard.  Pulmonary:      Effort: Pulmonary effort is normal. No respiratory distress.      Breath sounds: No rales.   Musculoskeletal:      Right lower leg: No edema.      Left lower leg: No edema.   Neurological:      Mental Status: He is alert.      Gait: Gait normal.   Psychiatric:         Judgment: Judgment normal.            Assessment:     1. Mixed hyperlipidemia  Lipid Profile      2. Family history of premature coronary artery disease  Lipid Profile      3. Hypertension, unspecified type        4. Coronary artery disease due to calcified coronary lesion        5. Chewing tobacco nicotine dependence without complication               Medical " Decision Making:  Today's Assessment / Plan:   Coronary arteriosclerosis  Dyslipidemia  Family history of premature coronary artery disease  - His ASCVD risk is high, LDL goal less than 55.  He is tolerating the rosuvastatin 10 mg.  Will repeat his lipid profile now that he has been on this consistently.  If LDL is greater than 55 recommend increasing 20mg for a combination of 20/10.      Hypertension  -Readings controlled at home and in office today.  He continues on metoprolol 50 mg twice daily which he seems to be tolerating without adverse effects.  While this likely does not have much blood flow component seems to be working for him and we will continue this.    Nicotine dependence  - continue to reduce use of nicotine pouches  Return in about 1 year (around 4/23/2026) for follow up with me.  Sooner if problems.

## (undated) DEVICE — MASK, LARYNGEAL AIRWAY #4

## (undated) DEVICE — SENSOR SPO2 NEO LNCS ADHESIVE (20/BX) SEE USER NOTES

## (undated) DEVICE — SODIUM CHL IRRIGATION 0.9% 1000ML (12EA/CA)

## (undated) DEVICE — NEPTUNE 4 PORT MANIFOLD - (20/PK)

## (undated) DEVICE — SUCTION INSTRUMENT YANKAUER BULBOUS TIP W/O VENT (50EA/CA)

## (undated) DEVICE — CHLORAPREP 26 ML APPLICATOR - ORANGE TINT(25/CA)

## (undated) DEVICE — KIT ANESTHESIA W/CIRCUIT & 3/LT BAG W/FILTER (20EA/CA)

## (undated) DEVICE — ELECTRODE 850 FOAM ADHESIVE - HYDROGEL RADIOTRNSPRNT (50/PK)

## (undated) DEVICE — LACTATED RINGERS INJ 1000 ML - (14EA/CA 60CA/PF)

## (undated) DEVICE — SET LEADWIRE 5 LEAD BEDSIDE DISPOSABLE ECG (1SET OF 5/EA)

## (undated) DEVICE — SET EXTENSION WITH 2 PORTS (48EA/CA) ***PART #2C8610 IS A SUBSTITUTE*****

## (undated) DEVICE — CORDS BIPOLAR COAGULATION - 12FT STERILE DISP. (10EA/BX)

## (undated) DEVICE — DETERGENT RENUZYME PLUS 10 OZ PACKET (50/BX)

## (undated) DEVICE — TUBING CLEARLINK DUO-VENT - C-FLO (48EA/CA)

## (undated) DEVICE — BANDAGE ELASTIC 3IN X 5 YDS. - (10/BX 5BX/CA)

## (undated) DEVICE — PROTECTOR ULNA NERVE - (36PR/CA)

## (undated) DEVICE — HEAD HOLDER JUNIOR/ADULT

## (undated) DEVICE — GOWN WARMING STANDARD FLEX - (30/CA)

## (undated) DEVICE — MASK ANESTHESIA ADULT  - (100/CA)

## (undated) DEVICE — SUTURE GENERAL

## (undated) DEVICE — DRAPE STRLE REG TOWEL 18X24 - (10/BX 4BX/CA)"

## (undated) DEVICE — CANISTER SUCTION 3000ML MECHANICAL FILTER AUTO SHUTOFF MEDI-VAC NONSTERILE LF DISP  (40EA/CA)

## (undated) DEVICE — SUTURE 4-0 ETHILON FS-2 18 (36PK/BX)"